# Patient Record
Sex: MALE | Race: BLACK OR AFRICAN AMERICAN | NOT HISPANIC OR LATINO | Employment: UNEMPLOYED | URBAN - METROPOLITAN AREA
[De-identification: names, ages, dates, MRNs, and addresses within clinical notes are randomized per-mention and may not be internally consistent; named-entity substitution may affect disease eponyms.]

---

## 2020-07-27 ENCOUNTER — OFFICE VISIT (OUTPATIENT)
Dept: FAMILY MEDICINE | Facility: CLINIC | Age: 35
End: 2020-07-27
Payer: MEDICAID

## 2020-07-27 VITALS
WEIGHT: 189 LBS | TEMPERATURE: 98.3 F | HEART RATE: 114 BPM | BODY MASS INDEX: 29.66 KG/M2 | SYSTOLIC BLOOD PRESSURE: 135 MMHG | DIASTOLIC BLOOD PRESSURE: 85 MMHG | RESPIRATION RATE: 16 BRPM | OXYGEN SATURATION: 98 % | HEIGHT: 67 IN

## 2020-07-27 DIAGNOSIS — Z00.00 ROUTINE GENERAL MEDICAL EXAMINATION AT A HEALTH CARE FACILITY: Primary | ICD-10-CM

## 2020-07-27 DIAGNOSIS — F31.81 BIPOLAR II DISORDER (H): ICD-10-CM

## 2020-07-27 DIAGNOSIS — F90.9 ATTENTION DEFICIT HYPERACTIVITY DISORDER (ADHD), UNSPECIFIED ADHD TYPE: ICD-10-CM

## 2020-07-27 LAB
BASOPHILS # BLD AUTO: 0 10E9/L (ref 0–0.2)
BASOPHILS NFR BLD AUTO: 0.5 %
DIFFERENTIAL METHOD BLD: ABNORMAL
EOSINOPHIL # BLD AUTO: 0.1 10E9/L (ref 0–0.7)
EOSINOPHIL NFR BLD AUTO: 2.7 %
ERYTHROCYTE [DISTWIDTH] IN BLOOD BY AUTOMATED COUNT: 13 % (ref 10–15)
HBA1C MFR BLD: 5.3 % (ref 0–5.6)
HCT VFR BLD AUTO: 45.1 % (ref 40–53)
HGB BLD-MCNC: 15.4 G/DL (ref 13.3–17.7)
LYMPHOCYTES # BLD AUTO: 1.3 10E9/L (ref 0.8–5.3)
LYMPHOCYTES NFR BLD AUTO: 35.4 %
MCH RBC QN AUTO: 28.9 PG (ref 26.5–33)
MCHC RBC AUTO-ENTMCNC: 34.1 G/DL (ref 31.5–36.5)
MCV RBC AUTO: 85 FL (ref 78–100)
MONOCYTES # BLD AUTO: 0.6 10E9/L (ref 0–1.3)
MONOCYTES NFR BLD AUTO: 15.8 %
NEUTROPHILS # BLD AUTO: 1.7 10E9/L (ref 1.6–8.3)
NEUTROPHILS NFR BLD AUTO: 45.6 %
PLATELET # BLD AUTO: 238 10E9/L (ref 150–450)
RBC # BLD AUTO: 5.32 10E12/L (ref 4.4–5.9)
WBC # BLD AUTO: 3.7 10E9/L (ref 4–11)

## 2020-07-27 PROCEDURE — 83036 HEMOGLOBIN GLYCOSYLATED A1C: CPT | Performed by: INTERNAL MEDICINE

## 2020-07-27 PROCEDURE — 36415 COLL VENOUS BLD VENIPUNCTURE: CPT | Performed by: INTERNAL MEDICINE

## 2020-07-27 PROCEDURE — 99385 PREV VISIT NEW AGE 18-39: CPT | Performed by: INTERNAL MEDICINE

## 2020-07-27 PROCEDURE — 80053 COMPREHEN METABOLIC PANEL: CPT | Performed by: INTERNAL MEDICINE

## 2020-07-27 PROCEDURE — 80061 LIPID PANEL: CPT | Performed by: INTERNAL MEDICINE

## 2020-07-27 PROCEDURE — 85025 COMPLETE CBC W/AUTO DIFF WBC: CPT | Performed by: INTERNAL MEDICINE

## 2020-07-27 RX ORDER — OLANZAPINE 20 MG/1
20 TABLET ORAL 2 TIMES DAILY PRN
COMMUNITY
Start: 2020-07-14 | End: 2022-07-22

## 2020-07-27 ASSESSMENT — PAIN SCALES - GENERAL: PAINLEVEL: NO PAIN (0)

## 2020-07-27 ASSESSMENT — MIFFLIN-ST. JEOR: SCORE: 1745.93

## 2020-07-27 NOTE — PATIENT INSTRUCTIONS
Preventive Health Recommendations  Male Ages 26 - 39    Yearly exam:             See your health care provider every year in order to  o   Review health changes.   o   Discuss preventive care.    o   Review your medicines if your doctor has prescribed any.    You should be tested each year for STDs (sexually transmitted diseases), if you re at risk.     After age 35, talk to your provider about cholesterol testing. If you are at risk for heart disease, have your cholesterol tested at least every 5 years.     If you are at risk for diabetes, you should have a diabetes test (fasting glucose).  Shots: Get a flu shot each year. Get a tetanus shot every 10 years.     Nutrition:    Eat at least 5 servings of fruits and vegetables daily.     Eat whole-grain bread, whole-wheat pasta and brown rice instead of white grains and rice.     Get adequate Calcium and Vitamin D.     Lifestyle    Exercise for at least 150 minutes a week (30 minutes a day, 5 days a week). This will help you control your weight and prevent disease.     Limit alcohol to one drink per day.     No smoking.     Wear sunscreen to prevent skin cancer.     See your dentist every six months for an exam and cleaning.     Patient Education     Understanding Bipolar Disorder  Bipolar disorder is a serious disorder of the brain. It may severely disrupt your life. At times, it may cause you and your loved ones great pain. But there is hope. Although there is no cure, treatment can help control your symptoms. Talk with your healthcare provider or a mental health professional. He or she can offer guidance and support.    What causes bipolar disorder?  The exact causes of bipolar disorder aren t known. It is known that the disease runs in families. Genes that affect nerve cells in the brain may be inherited, but as yet these genes have not been found.  Who does it affect?  Over 5 million adults in this country have bipolar disorder. Most often, it strikes young  adults. It can affect children and older adults as well. Bipolar disorder affects both men and women. It can strike people of all races, cultures, and incomes.  Ups and downs  Bipolar disorder used to be called manic-depressive illness. That is because it causes extreme mood swings. At times the person may feel almost too happy. These times are often followed by great despair. In some cases, both extremes may occur at once. More often, moods shift back and forth. These mood swings may occur just once in a while. Or they may happen 4 or more times a year. Without treatment, they will likely recur throughout life.  Manic episodes  During manic episodes of bipolar disorder, you feel like you re on top of the world. Even the worst news can t bring you down. You ll likely feel as if you can do anything. And sometimes you may try. You may take great risks, thinking you can t be hurt. You may also talk too fast, and your thoughts may race. You may go for days without sleeping. And you might be very active and do a lot of things in a short time. Manic episodes often end in a depression.  Depressive episodes  In depressive episodes, you feel intense sadness and depression. You may also feel worthless, tired, and helpless. Even the things you value most don t give you pleasure. At times you may want to die. You may even think about taking your own life.  Date Last Reviewed: 2/1/2017 2000-2019 [employer]. [address] [place] [address], [place] [address]. All rights reserved. This information is not intended as a substitute for professional medical care. Always follow your healthcare professional's instructions.
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
## 2020-07-28 ENCOUNTER — TELEPHONE (OUTPATIENT)
Dept: FAMILY MEDICINE | Facility: CLINIC | Age: 35
End: 2020-07-28

## 2020-07-28 DIAGNOSIS — E78.1 HYPERTRIGLYCERIDEMIA: Primary | ICD-10-CM

## 2020-07-28 LAB
ALBUMIN SERPL-MCNC: 3.6 G/DL (ref 3.4–5)
ALP SERPL-CCNC: 77 U/L (ref 40–150)
ALT SERPL W P-5'-P-CCNC: 73 U/L (ref 0–70)
ANION GAP SERPL CALCULATED.3IONS-SCNC: 7 MMOL/L (ref 3–14)
AST SERPL W P-5'-P-CCNC: 37 U/L (ref 0–45)
BILIRUB SERPL-MCNC: 0.2 MG/DL (ref 0.2–1.3)
BUN SERPL-MCNC: 12 MG/DL (ref 7–30)
CALCIUM SERPL-MCNC: 9.2 MG/DL (ref 8.5–10.1)
CHLORIDE SERPL-SCNC: 107 MMOL/L (ref 94–109)
CHOLEST SERPL-MCNC: 193 MG/DL
CO2 SERPL-SCNC: 25 MMOL/L (ref 20–32)
CREAT SERPL-MCNC: 1.08 MG/DL (ref 0.66–1.25)
GFR SERPL CREATININE-BSD FRML MDRD: 87 ML/MIN/{1.73_M2}
GLUCOSE SERPL-MCNC: 90 MG/DL (ref 70–99)
HDLC SERPL-MCNC: 55 MG/DL
LDLC SERPL CALC-MCNC: 92 MG/DL
NONHDLC SERPL-MCNC: 138 MG/DL
POTASSIUM SERPL-SCNC: 4 MMOL/L (ref 3.4–5.3)
PROT SERPL-MCNC: 7.8 G/DL (ref 6.8–8.8)
SODIUM SERPL-SCNC: 139 MMOL/L (ref 133–144)
TRIGL SERPL-MCNC: 230 MG/DL

## 2020-07-28 NOTE — TELEPHONE ENCOUNTER
Patient's triglycerides are elevated  I talked to him  Most probably could be from combination of diet and olanzapine  Discussed with him about exercise and diet  We will repeat again in 6 months to 1 year and if not improving then will need treatment

## 2020-07-29 ENCOUNTER — TELEPHONE (OUTPATIENT)
Dept: FAMILY MEDICINE | Facility: CLINIC | Age: 35
End: 2020-07-29

## 2020-07-29 NOTE — TELEPHONE ENCOUNTER
Reason for Call:  Other Forms    Detailed comments: Pt calling for Social Security sent him a letter and they want Pt to fill out a form and wanted to know if Dr. Hickman is suppose to be filling out a portion of the form and would like a call back to discuss further for Pt is not sure.    Phone Number Patient can be reached at: Home number on file [phone] (home)    Best Time: anytime    Can we leave a detailed message on this number? YES    Call taken on 7/29/2020 at 7:57 AM by Reji Dumont

## 2020-08-13 ENCOUNTER — TELEPHONE (OUTPATIENT)
Dept: FAMILY MEDICINE | Facility: CLINIC | Age: 35
End: 2020-08-13

## 2020-08-13 NOTE — TELEPHONE ENCOUNTER
Contact Argenis @ [facility] [phone] she wants to verify medications.  Wondering specifically about Metformin for patient.

## 2020-08-13 NOTE — TELEPHONE ENCOUNTER
"Delegating to team, please return call to Argenis at group home and clarify what she is needing or asking.  Do not have Metformin on current medication list. Only have Zyprexa (Olanzapine) and is listed as \"patient reported\". Patient is new to our office. May need to get a copy of a current medication list they have or verify meds.  Route encounter as appropriate, with additional information.  Thank you.    Lidia Philippe RN  [facility]      "

## 2020-08-13 NOTE — TELEPHONE ENCOUNTER
Reason for Call:  Other call back    Detailed comments: Patient would like call back to verify medications.  See note below.    Phone Number Patient can be reached at: Cell number on file:    Telephone Information:   Mobile [phone]       Best Time: Any    Can we leave a detailed message on this number? YES    Call taken on 8/13/2020 at 1:49 PM by Danielito Bar

## 2020-08-18 NOTE — TELEPHONE ENCOUNTER
This writer attempted to contact Argenis on 08/18/20      Reason for call medication rec and left message.      If patient calls back:   [facility] Team (MA/TC) called. Inform patient that someone from the team will contact them, document that pt called and route to care team.         Lara Rodriguez

## 2020-08-19 RX ORDER — GABAPENTIN 600 MG/1
600 TABLET ORAL 2 TIMES DAILY
Qty: 60 TABLET | Refills: 0 | COMMUNITY
Start: 2020-08-19 | End: 2020-09-25

## 2020-08-19 RX ORDER — ESCITALOPRAM OXALATE 20 MG/1
20 TABLET ORAL
COMMUNITY
Start: 2020-08-19 | End: 2020-09-25

## 2020-08-19 RX ORDER — ESCITALOPRAM OXALATE 5 MG/1
5 TABLET ORAL 2 TIMES DAILY
Qty: 60 TABLET | Refills: 0 | COMMUNITY
Start: 2020-08-19 | End: 2020-09-25

## 2020-08-19 RX ORDER — PHENYTOIN 50 MG/1
50 TABLET, CHEWABLE ORAL 2 TIMES DAILY
Qty: 60 TABLET | Refills: 0 | COMMUNITY
Start: 2020-08-19 | End: 2020-09-25

## 2020-08-19 NOTE — TELEPHONE ENCOUNTER
Reason for Call:  Other prescription    Detailed comments: Pt returning phone call and would like a phone call back regarding medication    Phone Number Patient can be reached at: Other phone number:  [phone]    Best Time: anytime    Can we leave a detailed message on this number? YES    Call taken on 8/19/2020 at 8:02 AM by Reji Dumont

## 2020-08-19 NOTE — TELEPHONE ENCOUNTER
Medication list has been updated by me  He is on Lexapro 25 mg p.o. twice daily  This is a very high dose I will have to review this again when I see the patient next time and verify this and see who has prescribed it and if he is following with a mental health specialist

## 2020-08-19 NOTE — TELEPHONE ENCOUNTER
Called Argenis to clarify what medications are needed. Argenis is with  Alok and he states he is on :    Dilantin 50mg BID    Metformin (unable to tell me dose) - not taking since february    Gabapentin 600mg BID    Lexapro 25mg BID

## 2020-09-25 ENCOUNTER — OFFICE VISIT (OUTPATIENT)
Dept: FAMILY MEDICINE | Facility: CLINIC | Age: 35
End: 2020-09-25
Payer: MEDICAID

## 2020-09-25 VITALS
TEMPERATURE: 98.1 F | RESPIRATION RATE: 20 BRPM | HEIGHT: 67 IN | BODY MASS INDEX: 32.49 KG/M2 | WEIGHT: 207 LBS | SYSTOLIC BLOOD PRESSURE: 134 MMHG | OXYGEN SATURATION: 98 % | DIASTOLIC BLOOD PRESSURE: 82 MMHG | HEART RATE: 88 BPM

## 2020-09-25 DIAGNOSIS — R56.9 SEIZURES (H): ICD-10-CM

## 2020-09-25 DIAGNOSIS — E78.1 HIGH TRIGLYCERIDES: ICD-10-CM

## 2020-09-25 DIAGNOSIS — G47.00 INSOMNIA, UNSPECIFIED TYPE: ICD-10-CM

## 2020-09-25 DIAGNOSIS — M79.661 PAIN OF RIGHT LOWER LEG: ICD-10-CM

## 2020-09-25 DIAGNOSIS — F90.9 ATTENTION DEFICIT HYPERACTIVITY DISORDER (ADHD), UNSPECIFIED ADHD TYPE: ICD-10-CM

## 2020-09-25 DIAGNOSIS — F31.81 BIPOLAR II DISORDER (H): Primary | ICD-10-CM

## 2020-09-25 LAB — PHENYTOIN SERPL-MCNC: 0.7 MG/L (ref 10–20)

## 2020-09-25 PROCEDURE — 99214 OFFICE O/P EST MOD 30 MIN: CPT | Performed by: INTERNAL MEDICINE

## 2020-09-25 PROCEDURE — 80185 ASSAY OF PHENYTOIN TOTAL: CPT | Performed by: INTERNAL MEDICINE

## 2020-09-25 PROCEDURE — 36415 COLL VENOUS BLD VENIPUNCTURE: CPT | Performed by: INTERNAL MEDICINE

## 2020-09-25 RX ORDER — GABAPENTIN 600 MG/1
600 TABLET ORAL 2 TIMES DAILY
Qty: 60 TABLET | Refills: 1 | Status: SHIPPED | OUTPATIENT
Start: 2020-09-25 | End: 2020-10-22

## 2020-09-25 RX ORDER — ESCITALOPRAM OXALATE 20 MG/1
20 TABLET ORAL
Qty: 60 TABLET | Refills: 1 | Status: SHIPPED | OUTPATIENT
Start: 2020-09-25 | End: 2020-11-08

## 2020-09-25 RX ORDER — MIRTAZAPINE 45 MG/1
45 TABLET, FILM COATED ORAL AT BEDTIME
Qty: 30 TABLET | Refills: 1 | Status: SHIPPED | OUTPATIENT
Start: 2020-09-25 | End: 2020-10-22

## 2020-09-25 RX ORDER — ESCITALOPRAM OXALATE 5 MG/1
5 TABLET ORAL 2 TIMES DAILY
Qty: 60 TABLET | Refills: 1 | Status: SHIPPED | OUTPATIENT
Start: 2020-09-25 | End: 2020-10-22

## 2020-09-25 RX ORDER — QUETIAPINE FUMARATE 50 MG/1
1 TABLET, FILM COATED ORAL AT BEDTIME
COMMUNITY
Start: 2020-09-09 | End: 2022-07-22

## 2020-09-25 RX ORDER — PHENYTOIN 50 MG/1
50 TABLET, CHEWABLE ORAL 2 TIMES DAILY
Qty: 60 TABLET | Refills: 1 | Status: SHIPPED | OUTPATIENT
Start: 2020-09-25 | End: 2020-10-22

## 2020-09-25 ASSESSMENT — MIFFLIN-ST. JEOR: SCORE: 1827.58

## 2020-09-25 ASSESSMENT — PAIN SCALES - GENERAL: PAINLEVEL: NO PAIN (0)

## 2020-09-25 NOTE — PATIENT INSTRUCTIONS
At [facility], we strive to deliver an exceptional experience to you, every time we see you. If you receive a survey, please complete it as we do value your feedback.  If you have MyChart, you can expect to receive results automatically within 24 hours of their completion.  Your provider will send a note interpreting your results as well.   If you do not have MyChart, you should receive your results in about a week by mail.    Your care team:                            Family Medicine Internal Medicine   MD James Ybarra MD Shantel Branch-Fleming, MD Srinivasa Vaka, MD Katya Georgiev PA-C Megan Hill, APRN CNP    Dixon Mayes, MD Pediatrics   Chidi Deutsch, PARachelC  Darlene Lewis, CNP MD Eryn Monroe APRN CNP   MD Mary Barajas MD Deborah Mielke, MD Maria Alexander, APRN CNP  Yun Cuellar, PARachelC  Ivelisse Fam, CNP  MD Pat Mckeon MD Angela Wermerskirchen, MD      Clinic hours: Monday - Thursday 7 am-7 pm; Fridays 7 am-5 pm.   Urgent care: Monday - Friday 11 am-9 pm; Saturday and Sunday 9 am-5 pm.    Clinic: [phone]       [facility]: Monday - Thursday 8 am - 7 pm; Friday 8 am - 6 pm  [facility]: [phone]     Use [email or web address] for 24/7 diagnosis and treatment of dozens of conditions.

## 2020-09-25 NOTE — PROGRESS NOTES
"Subjective     Alok Aquino is a 34 year old male who presents to clinic today for the following health issues:    HPI   Here today to discuss about his medications  He wants to get renewal of his Seroquel/Lexapro  Tells me that he is also on mirtazapine for insomnia which I checked the records and is true  He also takes Dialntin for seizures  Complaining of pain in the right leg today  Is a chronic pain for which he takes gabapentin  He had a bad injury few years ago and he is having trouble now walking because of it and having pain  He wants UFO for that  Medication Followup of all medication    Taking Medication as prescribed: yes taking a few at the Group Home -Zyprexa and Seroquel.    Side Effects:  None    Medication Helping Symptoms:  yes         Review of Systems   Constitutional, HEENT, cardiovascular, pulmonary, gi and gu systems are negative, except as otherwise noted.      Objective    /82 (BP Location: Left arm, Patient Position: Chair, Cuff Size: Adult Regular)   Pulse 88   Temp 98.1  F (36.7  C) (Oral)   Resp 20   Ht 1.702 m (5' 7\")   Wt 93.9 kg (207 lb)   SpO2 98%   BMI 32.42 kg/m    Body mass index is 32.42 kg/m .  Physical Exam   GENERAL: healthy, alert and no distress  EYES: Eyes grossly normal to inspection, PERRL and conjunctivae and sclerae normal  HENT: ear canals and TM's normal, nose and mouth without ulcers or lesions  NECK: no adenopathy, no asymmetry, masses, or scars and thyroid normal to palpation  RESP: lungs clear to auscultation - no rales, rhonchi or wheezes  CV: regular rate and rhythm, normal S1 S2, no S3 or S4, no murmur, click or rub, no peripheral edema and peripheral pulses strong  ABDOMEN: soft, nontender, no hepatosplenomegaly, no masses and bowel sounds normal  MS: Scar noted on the backside of right leg  SKIN: no suspicious lesions or rashes  NEURO: Normal strength and tone, mentation intact and speech normal  PSYCH: mentation appears normal, affect " normal/bright            Assessment & Plan     Bipolar II disorder (H)  He also takes Zyprexa and Seroquel  I had a lipid panel done earlier this year which showed elevated triglycerides and normal LDL normal A1c  We discussed at that point about diet and exercise  - escitalopram (LEXAPRO) 20 MG tablet; Take 1 tablet (20 mg) by mouth 2 times daily  - escitalopram (LEXAPRO) 5 MG tablet; Take 1 tablet (5 mg) by mouth 2 times daily    Attention deficit hyperactivity disorder (ADHD), unspecified ADHD type  He is not on any stimulants    Pain of right lower leg  Longstanding problem he wants a UFO  - Orthopedic & Spine  Referral; Future  - gabapentin (NEURONTIN) 600 MG tablet; Take 1 tablet (600 mg) by mouth 2 times daily    Insomnia, unspecified type  This is old medication and I verified it in the records  - mirtazapine (REMERON) 45 MG tablet; Take 1 tablet (45 mg) by mouth At Bedtime    Seizures (H)  Per patient he was not taking it for some time when he was not present but I restarted it in August  We will check phenytoin levels  - phenytoin (DILANTIN) 50 MG chewable tablet; Take 1 tablet (50 mg) by mouth 2 times daily  - Phenytoin level           Return in about 3 months (around 12/25/2020).    Jon Hickman MD  [facility]

## 2020-10-22 DIAGNOSIS — G47.00 INSOMNIA, UNSPECIFIED TYPE: ICD-10-CM

## 2020-10-22 DIAGNOSIS — F31.81 BIPOLAR II DISORDER (H): ICD-10-CM

## 2020-10-22 DIAGNOSIS — M79.661 PAIN OF RIGHT LOWER LEG: ICD-10-CM

## 2020-10-22 DIAGNOSIS — R56.9 SEIZURES (H): ICD-10-CM

## 2020-10-22 RX ORDER — MIRTAZAPINE 45 MG/1
45 TABLET, FILM COATED ORAL AT BEDTIME
Qty: 30 TABLET | Refills: 1 | Status: SHIPPED | OUTPATIENT
Start: 2020-10-22 | End: 2020-12-07

## 2020-10-22 RX ORDER — GABAPENTIN 600 MG/1
600 TABLET ORAL 2 TIMES DAILY
Qty: 60 TABLET | Refills: 1 | Status: SHIPPED | OUTPATIENT
Start: 2020-10-22 | End: 2020-12-07

## 2020-10-22 RX ORDER — ESCITALOPRAM OXALATE 5 MG/1
5 TABLET ORAL 2 TIMES DAILY
Qty: 60 TABLET | Refills: 1 | Status: SHIPPED | OUTPATIENT
Start: 2020-10-22 | End: 2020-12-07

## 2020-10-22 RX ORDER — PHENYTOIN 50 MG/1
TABLET, CHEWABLE ORAL
Qty: 60 TABLET | Refills: 1 | Status: SHIPPED | OUTPATIENT
Start: 2020-10-22 | End: 2020-12-29

## 2020-10-29 ENCOUNTER — OFFICE VISIT (OUTPATIENT)
Dept: ORTHOPEDICS | Facility: CLINIC | Age: 35
End: 2020-10-29
Payer: MEDICAID

## 2020-10-29 VITALS — BODY MASS INDEX: 32.49 KG/M2 | HEIGHT: 67 IN | WEIGHT: 207 LBS

## 2020-10-29 DIAGNOSIS — M79.661 PAIN OF RIGHT LOWER LEG: ICD-10-CM

## 2020-10-29 DIAGNOSIS — M21.371 RIGHT FOOT DROP: Primary | ICD-10-CM

## 2020-10-29 DIAGNOSIS — G56.91 NEUROPATHY OF FINGER OF RIGHT HAND: ICD-10-CM

## 2020-10-29 PROCEDURE — 99214 OFFICE O/P EST MOD 30 MIN: CPT | Performed by: FAMILY MEDICINE

## 2020-10-29 ASSESSMENT — MIFFLIN-ST. JEOR: SCORE: 1827.58

## 2020-10-29 NOTE — PATIENT INSTRUCTIONS
Thanks for coming today.  Ortho/Sports Medicine Clinic  [address] [place] [address]    To schedule future appointments in Ortho Clinic, you may call [phone].    To schedule ordered imaging by your Provider: Call [facility] at [phone]    [email or web address] available online at:   [email or web address]    Please call if any further questions or concerns [phone] and ask for the Orthopedic Department. Clinic hours 8 am to 5 pm.    Return to clinic if symptoms worsen.
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
## 2020-10-29 NOTE — PROGRESS NOTES
"      [facility]  10/29/2020    Alok Aquino's chief complaint for this visit includes:  Chief Complaint   Patient presents with     Consult     Right leg pain, walks with a limp, cut a blood vessel in his upper leg in 2010 and has had a limp since, would like to disucss is an AFO would help.      PCP: No Ref-Primary, Physician    Referring Provider:  Jon Hickman MD  [address]  [place]  [place] [address]    Ht 1.702 m (5' 7\")   Wt 93.9 kg (207 lb)   BMI 32.42 kg/m    Data Unavailable       Reason for visit:     What part of your body is injured / painful?  right leg     What caused the injury /pain? No inciting event     How long ago did your injury occur or pain begin? problem is longstanding    What are your most bothersome symptoms? Pain    How would you characterize your symptom?  aching    What makes your symptoms better? Rest    What makes your symptoms worse? Movement    Have you been previously seen for this problem? No    Medical History:    Any recent changes to your medical history? No    Any new medication prescribed since last visit? No    Have you had surgery on this body part before? No    Social History:    Occupation: Disabled     Handedness: Right    Review of Systems:    Do you have fever, chills, weight loss? No    Do you have any vision problems? No    Do you have any chest pain or edema? No    Do you have any shortness of breath or wheezing?  No    Do you have stomach problems? No    Do you have any urinary track issues? No    Do you have any numbness or focal weakness? No    Do you have diabetes? No    Do you have problems with bleeding or clotting? No    Do you have an rashes or other skin lesions? No       CHIEF COMPLAINT:  Consult (Right leg pain, walks with a limp, cut a blood vessel in his upper leg in 2010 and has had a limp since, would like to disucss is an AFO would help. )       HISTORY OF PRESENT ILLNESS  Mr. Aquino is a pleasant 34 year old male " who presents to clinic today with issues with his right foot and right hand.  Thom was injured in 2010 when he kicked through a window.  He severed arteries and nerves in the posterior aspect of his right lower leg.  He underwent extensive surgery and has had pain and dysfunction ever since.  He does walk with a limp, he feels that his right foot will drop at times.  He is interested in assistive devices for ambulation.    Thom also has a right fifth finger issue.  This is from a separate injury in 2010 when he grabbed a knife by the blade with his palm.  He has had difficulty flexing his finger ever since this episode.  He does get his finger caught on certain objects throughout the day.  This is his dominant hand.  He is interested in a splint for this.    Additional history: as documented    MEDICAL HISTORY  Patient Active Problem List   Diagnosis     ADHD (attention deficit hyperactivity disorder)     Bipolar II disorder (H)     High triglycerides     Seizures (H)     Insomnia, unspecified type       Current Outpatient Medications   Medication Sig Dispense Refill     escitalopram (LEXAPRO) 20 MG tablet Take 1 tablet (20 mg) by mouth 2 times daily 60 tablet 1     escitalopram (LEXAPRO) 5 MG tablet TAKE 1 TABLET (5 MG) BY MOUTH 2 TIMES DAILY 60 tablet 1     gabapentin (NEURONTIN) 600 MG tablet TAKE 1 TABLET (600 MG) BY MOUTH 2 TIMES DAILY 60 tablet 1     mirtazapine (REMERON) 45 MG tablet TAKE 1 TABLET (45 MG) BY MOUTH AT BEDTIME 30 tablet 1     OLANZapine (ZYPREXA) 20 MG tablet Take 20 mg by mouth 2 times daily as needed        phenytoin (DILANTIN) 50 MG chewable tablet CHEW AND SWALLOW ONE TABLET BY MOUTH TWO TIMES A DAY 60 tablet 1     QUEtiapine (SEROQUEL) 50 MG tablet Take 1 tablet by mouth At Bedtime         No Known Allergies    No family history on file.    Additional medical/Social/[facility] and updated as appropriate.        PHYSICAL EXAM    Vitals:    10/29/20 1445   Weight:  "93.9 kg (207 lb)   Height: 1.702 m (5' 7\")     General  - normal appearance, in no obvious distress  HEENT  - conjunctivae not injected, moist mucous membranes  CV  - normal pulses at posterior tib and dorsalis pedis  Pulm  - normal respiratory pattern, non-labored  Musculoskeletal - right lower leg, foot  - stance: gait favors right leg  - inspection: no swelling or effusion, normal bone and joint alignment, no obvious deformity  - palpation: no bony or soft tissue tenderness  - ROM: normal active and passive ROM of great and lesser toes, no pain with MT translation  - strength: 5/5 in all planes, objectively weaker in right foot dorsiflexion, plantarflexion    Musculoskeletal - right 5th finger  - inspection: no atrophy, normal joint alignment, no swelling  - palpation: no bony or soft tissue tenderness, no tenderness at the anatomical snuffbox  - ROM: cannot activate flexion of DIP  - special tests:  (-) varus  (-) valgus    Neuro  - no sensory or motor deficit, grossly normal coordination, normal muscle tone  Skin  - no ecchymosis, erythema, warmth, or induration, no obvious rash  Psych  - interactive, appropriate, normal mood and affect           ASSESSMENT & PLAN  Mr. Aquino is a 34 year old male who presents to clinic today with pain and weakness in his right foot and weakness in his right fifth finger.    I am referring Thom to our orthotist, who may be able to fit him with a ankle-foot orthotic or other brace to assist him in ambulation.    I suspect that Thom has either a flexor digitorum profundus injury or nerve damage of his fifth finger that is preventing flexion of his DIP.  I am referring Thom to hand therapy for a custom splint for his fifth finger.    We can follow-up as needed for this and other issues.    It was a pleasure seeing Thom today.    Maicol Preciado DO, [facility]  Primary [facility] Sports Medicine      This note was constructed using Dragon dictation software, please excuse any minor errors in " spelling, grammar, or syntax.

## 2020-11-07 DIAGNOSIS — F31.81 BIPOLAR II DISORDER (H): ICD-10-CM

## 2020-11-08 RX ORDER — ESCITALOPRAM OXALATE 20 MG/1
20 TABLET ORAL
Qty: 56 TABLET | Refills: 0 | Status: SHIPPED | OUTPATIENT
Start: 2020-11-08 | End: 2020-12-07

## 2020-11-10 ENCOUNTER — THERAPY VISIT (OUTPATIENT)
Dept: OCCUPATIONAL THERAPY | Facility: CLINIC | Age: 35
End: 2020-11-10
Payer: MEDICAID

## 2020-11-10 DIAGNOSIS — G56.91 NEUROPATHY OF FINGER OF RIGHT HAND: ICD-10-CM

## 2020-11-10 PROCEDURE — 97760 ORTHOTIC MGMT&TRAING 1ST ENC: CPT | Mod: GO | Performed by: OCCUPATIONAL THERAPIST

## 2020-11-10 NOTE — LETTER
DEPARTMENT OF HEALTH AND HUMAN SERVICES  CENTERS FOR MEDICARE & MEDICAID SERVICES    PLAN/UPDATED PLAN OF PROGRESS FOR OUTPATIENT REHABILITATION    PATIENTS NAME:  Alok Aquino     : 1985    PROVIDER NUMBER:  [id number]    [place]:  [id number]    PROVIDER NAME: [facility]    MEDICAL RECORD NUMBER: [id number]     START OF CARE DATE:          TYPE:  OT    PRIMARY/TREATMENT DIAGNOSIS: (Pertinent Medical Diagnosis)  Neuropathy of finger of right hand    VISITS FROM START OF CARE:  Rxs Used: 1     Hand Therapy Initial Evaluation  Current Date:  11/10/2020  Referring Physician: Dr. Preciado  Diagnosis: Right SF  DOI: 2002    Subjective:  Alok Aquino is a 34 year old right hand dominant male.    Patient reports symptoms of pain, stiffness/loss of motion, weakness/loss of strength, numbness and tingling (when cold outside)  of the right small finger which occurred due to an injury sustained while grabbing a knife.  Patient reports that he had nerve damage and tendon laceration. Since onset symptoms are Gradually getting worse.  Special tests:  none.  Previous treatment: none.        General health as reported by patient is poor.  Pertinent medical history includes:None  Medical allergies:none.  Surgical history: none.  Medication history: None.    Occupational Profile Information:  Current occupation is none  Prior functional level:  no limitations  Barriers include:lives in a group home.  Has trouble lifting things up with right hand  Mobility: walks with a limp, foot drop  Transportation: medical transportation  Leisure activities/hobbies: play cards, play games, video games, exercise, walking    Upper Extremity Functional Index Score:  SCORE:   Column Totals: /80: 34   (A lower score indicates greater disability.)  Objective:  Pain Level (Scale 0-10) Patient reports no pain in the finger only tingling and numbness and rates that at 7/10   11/10/2020   At Rest 0/10   With Use  0/10     Pain Description  Date 11/10/2020   Location small finger   Pain Quality Numb and Tingling   Frequency constant     Pain is worst  same day and night   Exacerbated by  use   Relieved by Rest finger in the bent position   Progression Gradually worsening     EdemaNone    Sensation Decreased Ulnar Nerve distribution per pt report    ROM  small Finger 11/10/20 11/10/20   AROM (PROM) Left Right   MCP /85 /60 (90)   PIP /90 /90(90)   DIP /70 /0(60)   LARA         Strength   Painfree (Measured in pounds)  Pain Report:  + mild    ++ moderate    +++ severe    11/10/2020 11/10/2020   Trials Left Right   1  2  3 48  45  43 26  24  25   Average 45 25     Palpation  Pain Report:  - none    + mild    ++ moderate    +++ severe     11/10/20      Between SF & MF MCP ++         Assessment:  Patient presents with symptoms consistent with diagnosis of right small finger neuopathy,  with conservative intervention.     Patient's limitations or Problem List includes:  Decreased ROM/motion, Weakness, Sensory disturbance and Decreased  of the right small finger which interferes with the patient's ability to perform Self Care Tasks (dressing, bathing), Recreational Activities, Household Chores and Driving  as compared to previous level of function.    Rehab Potential:  Good - Return to full activity, some limitations    Patient will benefit from skilled Occupational Therapy to increase flexibility, overall strength and sensation and decrease pain to return to previous activity level and resume normal daily tasks and to reach their rehab potential.    Barriers to Learning:  Mental health    Communication Issues:  Patient appears to be able to clearly communicate and understand verbal and written communication and follow directions correctly.    Chart Review: Brief history including review of medical and/or therapy records relating to the presenting problem and Simple history review with patient    Identified Performance  "Deficits: bathing/showering, dressing, home establishment and management, work and leisure activities    Assessment of Occupational Performance:  3-5 Performance Deficits    Clinical Decision Making (Complexity): Low complexity    Treatment Explanation:  The following has been discussed with the patient:  RX ordered/plan of care  Anticipated outcomes  Possible risks and side effects    Plan:  Frequency:  1 X week, once daily  Duration:  for 4 weeks    Treatment Plan:   Orthotic Fabrication:  Static orthosis and Finger based orthosis    Discharge Plan:  Achieve all LTG.  Independent in home treatment program.  Reach maximal therapeutic benefit.    Home Exercise Program:  Compliance with finger gutter splint in semi flexed posture to relieve nerve symptoms    Next Visit:  No further visits scheduled at this time. The chart will be left open for one month to allow patient to schedule further appointments if problems develop. If no additional therapy sessions are scheduled, then the patient will be discharged and this will serve as a discharge note.    Caregiver Signature/Credentials ______________________________ Date ________       Treating Provider: Ching ELDER/L,CHT      I have reviewed and certified the need for these services and plan of treatment while under my care.          PHYSICIAN'S SIGNATURE:   _____________________________________  Date___________                        Maicol Preciado DO    Certification period:     11/10/20 to 02/07/20    Functional Level Progress Report: Please see attached \"Goal Flow sheet for Functional level.\"    ___X_____ Continue Services or       ________ DC Services                Service dates:  11/10/20  to present                                                                     "

## 2020-11-10 NOTE — PROGRESS NOTES
Hand Therapy Initial Evaluation    Current Date:  11/10/2020  Referring Physician: Dr. Preciado    Diagnosis: Right SF  DOI: 11/2002    Subjective:  Alok Aquino is a 34 year old right hand dominant male.    Patient reports symptoms of pain, stiffness/loss of motion, weakness/loss of strength, numbness and tingling (when cold outside)  of the right small finger which occurred due to an injury sustained while grabbing a knife.  Patient reports that he had nerve damage and tendon laceration. Since onset symptoms are Gradually getting worse.  Special tests:  none.  Previous treatment: none.    General health as reported by patient is poor.  Pertinent medical history includes:None  Medical allergies:none.  Surgical history: none.  Medication history: None.    Occupational Profile Information:  Current occupation is none  Prior functional level:  no limitations  Barriers include:lives in a group home.  Has trouble lifting things up with right hand  Mobility: walks with a limp, foot drop  Transportation: medical transportation  Leisure activities/hobbies: play cards, play games, video games, exercise, walking    Upper Extremity Functional Index Score:  SCORE:   Column Totals: /80: 34   (A lower score indicates greater disability.)    Objective:  Pain Level (Scale 0-10) Patient reports no pain in the finger only tingling and numbness and rates that at 7/10   11/10/2020   At Rest 0/10   With Use 0/10     Pain Description  Date 11/10/2020   Location small finger   Pain Quality Numb and Tingling   Frequency constant     Pain is worst  same day and night   Exacerbated by  use   Relieved by Rest finger in the bent position   Progression Gradually worsening     Edema  None    Sensation   Decreased Ulnar Nerve distribution per pt report    ROM  small Finger 11/10/20 11/10/20   AROM (PROM) Left Right   MCP /85 /60 (90)   PIP /90 /90(90)   DIP /70 /0(60)   LARA         Strength   Painfree (Measured in pounds)  Pain Report:  + mild     ++ moderate    +++ severe    11/10/2020 11/10/2020   Trials Left Right   1  2  3 48  45  43 26  24  25   Average 45 25       Palpation  Pain Report:  - none    + mild    ++ moderate    +++ severe     11/10/20      Between SF & MF MCP ++                                            Assessment:  Patient presents with symptoms consistent with diagnosis of right small finger neuopathy,  with conservative intervention.     Patient's limitations or Problem List includes:  Decreased ROM/motion, Weakness, Sensory disturbance and Decreased  of the right small finger which interferes with the patient's ability to perform Self Care Tasks (dressing, bathing), Recreational Activities, Household Chores and Driving  as compared to previous level of function.    Rehab Potential:  Good - Return to full activity, some limitations    Patient will benefit from skilled Occupational Therapy to increase flexibility, overall strength and sensation and decrease pain to return to previous activity level and resume normal daily tasks and to reach their rehab potential.    Barriers to Learning:  Mental health    Communication Issues:  Patient appears to be able to clearly communicate and understand verbal and written communication and follow directions correctly.    Chart Review: Brief history including review of medical and/or therapy records relating to the presenting problem and Simple history review with patient    Identified Performance Deficits: bathing/showering, dressing, home establishment and management, work and leisure activities    Assessment of Occupational Performance:  3-5 Performance Deficits    Clinical Decision Making (Complexity): Low complexity    Treatment Explanation:  The following has been discussed with the patient:  RX ordered/plan of care  Anticipated outcomes  Possible risks and side effects    Plan:  Frequency:  1 X week, once daily  Duration:  for 4 weeks    Treatment Plan:   Orthotic Fabrication:   Static orthosis and Finger based orthosis    Discharge Plan:  Achieve all LTG.  Independent in home treatment program.  Reach maximal therapeutic benefit.    Home Exercise Program:  Compliance with finger gutter splint in semi flexed posture to relieve nerve symptoms    Next Visit:  No further visits scheduled at this time. The chart will be left open for one month to allow patient to schedule further appointments if problems develop. If no additional therapy sessions are scheduled, then the patient will be discharged and this will serve as a discharge note.

## 2020-12-07 DIAGNOSIS — M79.661 PAIN OF RIGHT LOWER LEG: ICD-10-CM

## 2020-12-07 DIAGNOSIS — F31.81 BIPOLAR II DISORDER (H): ICD-10-CM

## 2020-12-07 DIAGNOSIS — G47.00 INSOMNIA, UNSPECIFIED TYPE: ICD-10-CM

## 2020-12-07 RX ORDER — GABAPENTIN 600 MG/1
600 TABLET ORAL 2 TIMES DAILY
Qty: 56 TABLET | Refills: 1 | Status: SHIPPED | OUTPATIENT
Start: 2020-12-07 | End: 2021-01-21

## 2020-12-07 RX ORDER — ESCITALOPRAM OXALATE 20 MG/1
20 TABLET ORAL
Qty: 56 TABLET | Refills: 0 | Status: SHIPPED | OUTPATIENT
Start: 2020-12-07 | End: 2021-01-03

## 2020-12-07 RX ORDER — MIRTAZAPINE 45 MG/1
45 TABLET, FILM COATED ORAL AT BEDTIME
Qty: 28 TABLET | Refills: 1 | Status: SHIPPED | OUTPATIENT
Start: 2020-12-07 | End: 2020-12-29

## 2020-12-07 RX ORDER — ESCITALOPRAM OXALATE 5 MG/1
5 TABLET ORAL 2 TIMES DAILY
Qty: 56 TABLET | Refills: 1 | Status: SHIPPED | OUTPATIENT
Start: 2020-12-07 | End: 2021-01-19

## 2020-12-29 DIAGNOSIS — F31.81 BIPOLAR II DISORDER (H): ICD-10-CM

## 2021-01-03 RX ORDER — ESCITALOPRAM OXALATE 20 MG/1
TABLET ORAL
Qty: 56 TABLET | Refills: 8 | Status: SHIPPED | OUTPATIENT
Start: 2021-01-03 | End: 2022-07-22

## 2021-01-19 ENCOUNTER — DOCUMENTATION ONLY (OUTPATIENT)
Dept: FAMILY MEDICINE | Facility: CLINIC | Age: 36
End: 2021-01-19

## 2021-01-19 NOTE — PROGRESS NOTES
Lexapro dose clarification  He is on 20 mg of Lexapro twice a day and also on 5 mg of Lexapro twice a day.  In total he takes 50 mg of Lexapro twice a day.

## 2021-02-05 ENCOUNTER — TELEPHONE (OUTPATIENT)
Dept: FAMILY MEDICINE | Facility: CLINIC | Age: 36
End: 2021-02-05

## 2021-02-05 NOTE — TELEPHONE ENCOUNTER
Patient is requesting a note be faxed to [phone], saying that he was seen for his leg and finger and that he got a brace for them. Also, that he is disabled.Isabela Samano

## 2021-02-05 NOTE — LETTER
February 15, 2021    RE:  Alok Aquino                              [address]  [place] [address]            To whom it may concern:    Alok Aquino is under my professional care for a medical condition. Alok has been since for his right lower extremity and right small finger. The patient was prescribed a brace a brace for his right ankle and right small finger. The patient is also disabled.     Please contact our office with any questions or concerns          Sincerely,        Maicol Preciado  DO

## 2021-02-05 NOTE — TELEPHONE ENCOUNTER
Patient was seen for this problem at [facility] who is the sports medicine specialist on 10/29/2020  I referred him there for this issue  He need to contact their office for the letter  Please call the patient and inform him

## 2021-02-05 NOTE — TELEPHONE ENCOUNTER
This writer attempted to contact pt on 02/05/21      Reason for call relay message and left message.      If patient calls back:   Relay message Patient was seen for this problem at [facility] who is the sports medicine specialist on 10/29/2020  I referred him there for this issue  He need to contact their office for the letter  Please call the patient and inform him, (read verbatim), document that pt called and close encounter        Manisha Leon MA

## 2021-02-17 DIAGNOSIS — F31.81 BIPOLAR II DISORDER (H): ICD-10-CM

## 2021-02-17 RX ORDER — ESCITALOPRAM OXALATE 5 MG/1
5 TABLET ORAL 2 TIMES DAILY
Qty: 56 TABLET | Refills: 0 | Status: SHIPPED | OUTPATIENT
Start: 2021-02-17 | End: 2021-03-12

## 2021-03-26 DIAGNOSIS — F31.81 BIPOLAR II DISORDER (H): ICD-10-CM

## 2021-03-26 RX ORDER — ESCITALOPRAM OXALATE 5 MG/1
5 TABLET ORAL 2 TIMES DAILY
Qty: 56 TABLET | Refills: 11 | OUTPATIENT
Start: 2021-03-26

## 2021-04-01 DIAGNOSIS — R56.9 SEIZURES (H): ICD-10-CM

## 2021-04-01 DIAGNOSIS — M79.661 PAIN OF RIGHT LOWER LEG: ICD-10-CM

## 2021-04-01 DIAGNOSIS — G47.00 INSOMNIA, UNSPECIFIED TYPE: ICD-10-CM

## 2021-04-01 RX ORDER — MIRTAZAPINE 45 MG/1
45 TABLET, FILM COATED ORAL AT BEDTIME
Qty: 28 TABLET | Refills: 0 | Status: SHIPPED | OUTPATIENT
Start: 2021-04-01 | End: 2022-07-22

## 2021-04-01 RX ORDER — GABAPENTIN 600 MG/1
600 TABLET ORAL 2 TIMES DAILY
Qty: 56 TABLET | Refills: 0 | Status: SHIPPED | OUTPATIENT
Start: 2021-04-01 | End: 2022-07-22

## 2021-04-01 RX ORDER — PHENYTOIN 50 MG/1
TABLET, CHEWABLE ORAL
Qty: 56 TABLET | Refills: 0 | Status: SHIPPED | OUTPATIENT
Start: 2021-04-01 | End: 2022-08-05

## 2021-04-01 NOTE — TELEPHONE ENCOUNTER
Refills sent but please call patient and/or group home: he is due for a medication check and labs. Please help set that up in the next few weeks.  Thank you,  CHELA Zafar, NP-C  [facility]

## 2021-07-01 ENCOUNTER — TELEPHONE (OUTPATIENT)
Dept: FAMILY MEDICINE | Facility: CLINIC | Age: 36
End: 2021-07-01

## 2021-07-02 ENCOUNTER — TELEPHONE (OUTPATIENT)
Dept: FAMILY MEDICINE | Facility: CLINIC | Age: 36
End: 2021-07-02

## 2021-07-02 NOTE — TELEPHONE ENCOUNTER
(3) [facility]  Placed on Ivelisse Fam's desk for signature.

## 2021-07-05 NOTE — TELEPHONE ENCOUNTER
Forms faxed to [phone] and placed in abstraction bin for scanning into patients chart    Left message to schedule in person visit

## 2021-07-05 NOTE — TELEPHONE ENCOUNTER
See other encounter. Will close this one as the forms were duplicate.  CHELA Zafar, NP-C  [facility]

## 2022-03-22 ENCOUNTER — TELEPHONE (OUTPATIENT)
Dept: FAMILY MEDICINE | Facility: CLINIC | Age: 37
End: 2022-03-22
Payer: MEDICAID

## 2022-03-22 RX ORDER — HALOPERIDOL DECANOATE 100 MG/ML
INJECTION INTRAMUSCULAR
COMMUNITY
Start: 2022-03-22

## 2022-03-22 RX ORDER — HALOPERIDOL 10 MG/1
1 TABLET ORAL AT BEDTIME
COMMUNITY
Start: 2022-02-23 | End: 2022-07-22

## 2022-03-22 NOTE — TELEPHONE ENCOUNTER
Incoming call from nurse Isa, from [facility]. Isa is requesting that pt's Haldol orders be faxed. RN advised Haldol is not managed by primary care and is not currently on pt's medication list, and inquired if pt is followed by a Psychiatric provider.    Isa nurse, from [facility], states pt is taking Haldol 10 mg tab daily at bedtime, and takes Haldol decanoate injection 200 mg IM every 4 weeks. RN updated pt's [facility] medication list to reflect these reported medication doses. nurse Isa, from [facility], states she has identified that it appears these were prescribed by [facility]. Isa states she will reach out to ordering provider's health system for the orders.    YADIRA Crump, RN

## 2022-03-25 DIAGNOSIS — Z91.030 BEE STING ALLERGY: Primary | ICD-10-CM

## 2022-03-25 RX ORDER — EPINEPHRINE 0.3 MG/.3ML
0.3 INJECTION SUBCUTANEOUS PRN
Qty: 2 EACH | Refills: 1 | Status: SHIPPED | OUTPATIENT
Start: 2022-03-25 | End: 2023-03-29

## 2022-03-25 NOTE — TELEPHONE ENCOUNTER
Catalina from [facility] calling on pt's behalf to request an order for Epipen. She states pt is requesting an Epipen for his bee sting allergy.    Routing refill request to provider for review/approval because:  Drug not active on patient's medication list    HENRY CrumpN, RN

## 2022-03-25 NOTE — TELEPHONE ENCOUNTER
Patient has not been seen since 9/25/20 Dr. Hickman   Routing to provider who he saw for physical 7/27/2020

## 2022-03-26 ENCOUNTER — TELEPHONE (OUTPATIENT)
Dept: FAMILY MEDICINE | Facility: CLINIC | Age: 37
End: 2022-03-26
Payer: MEDICAID

## 2022-03-26 NOTE — TELEPHONE ENCOUNTER
Reason for Call:  Medication or medication refill:    Do you use a [facility]?  Name of the pharmacy and phone number for the current request:  [facility] - [phone]    Name of the medication requested: Metropolol 25 mg    Other request: no    Can we leave a detailed message on this number? YES    Phone number patient can be reached at: Other phone number:  [phone], or [phone] ext [phone]    Best Time: Anytime    Call taken on 3/26/2022 at 12:04 PM by Ivana Ho

## 2022-03-28 NOTE — TELEPHONE ENCOUNTER
Spoke to [facility] pharmacy staff.    They are asking for the medication Metoprolol to be sent.    He is new to this pharmacy, they are unsure who to request this medication from.       The patient is in a Treatment facility.  Called the [facility].  The RN is not available, left message with reception to call the [facility] back to discuss the Rx.  Does not appear that the patient has seen a provider since 9/25/2020, however there is a Dr. Amilcar Sebastian listed as his PCP.    This Rx may have come from an outside provider.     Asked to call the [facility] back to discuss where to route this medication request to.    Isa Taylor RN, [facility]

## 2022-03-30 NOTE — TELEPHONE ENCOUNTER
Patient has not seen an [facility] provider since 9/25/2020 and medication is not active on patient's medication list. Patient to schedule visit with provider if needing med filled.    Sirisha Stewart RN  [facility]

## 2022-05-16 ENCOUNTER — TELEPHONE (OUTPATIENT)
Dept: FAMILY MEDICINE | Facility: CLINIC | Age: 37
End: 2022-05-16
Payer: MEDICAID

## 2022-05-18 NOTE — TELEPHONE ENCOUNTER
Lvm for pt to schedule appointment with provider to complete forms. I provided clinic number for pt to call back and schedule.

## 2022-05-18 NOTE — TELEPHONE ENCOUNTER
Patient has not been seen since 2020 by a [place] provider. Needs in person or video visit for filling out forms.  Thank you,  CHELA Zafar, NP-C  [facility]

## 2022-06-03 ENCOUNTER — TELEPHONE (OUTPATIENT)
Dept: FAMILY MEDICINE | Facility: CLINIC | Age: 37
End: 2022-06-03

## 2022-06-03 DIAGNOSIS — F31.81 BIPOLAR II DISORDER (H): Primary | ICD-10-CM

## 2022-06-03 RX ORDER — ESCITALOPRAM OXALATE 10 MG/1
TABLET ORAL
Qty: 16 TABLET | OUTPATIENT
Start: 2022-06-03

## 2022-06-06 ENCOUNTER — TELEPHONE (OUTPATIENT)
Dept: FAMILY MEDICINE | Facility: CLINIC | Age: 37
End: 2022-06-06

## 2022-06-06 NOTE — TELEPHONE ENCOUNTER
Forms received. Needs video or in person visit for filling out form. Patient has not been seen in over a year by a primary care provider. Please help [facility] set that up.  Thank you,  CHELA Zafar, NP-C  [facility]

## 2022-06-07 NOTE — TELEPHONE ENCOUNTER
Lvm for pt to schedule visit with provider to complete forms. I provided him with clinic number to call back.

## 2022-06-24 DIAGNOSIS — I10 HYPERTENSION GOAL BP (BLOOD PRESSURE) < 140/90: Primary | ICD-10-CM

## 2022-06-24 RX ORDER — ESCITALOPRAM OXALATE 10 MG/1
10 TABLET ORAL DAILY
Qty: 15 TABLET | Refills: 0 | Status: SHIPPED | OUTPATIENT
Start: 2022-06-24 | End: 2022-07-22

## 2022-06-24 RX ORDER — METOPROLOL SUCCINATE 25 MG/1
25 TABLET, EXTENDED RELEASE ORAL DAILY
Qty: 30 TABLET | Refills: 0 | Status: SHIPPED | OUTPATIENT
Start: 2022-06-24 | End: 2022-07-22

## 2022-06-24 NOTE — TELEPHONE ENCOUNTER
Chana from [facility] calling for metoprolol 25 mg daily to be send to the pharmacy.    Informed that it is not on medication list.    Chana states that patient has been taking it after discharged from the hospital in November.    [facility] is not on Epic. One attached is incorrect. Patient is using one below:    [address], [place] [address]    Phone: [phone]  Fax: [phone]    Medication can be faxed.    Patient recently moved into their facility.  Address updated.  Patient is due for a visit.  Transferred to a .    Maria Esther Dorsey RN

## 2022-06-24 NOTE — TELEPHONE ENCOUNTER
Not seen since 9/25/2020 - given 15 tablets.  Needs a video visit with whomever considers his PCP

## 2022-06-24 NOTE — TELEPHONE ENCOUNTER
This writer attempted to contact patient on 06/24/22      Reason for call medication and left message.      If patient calls back:   Registered Nurse called. Follow Triage Call workflow        Saloni Hidalgo RN

## 2022-06-28 NOTE — TELEPHONE ENCOUNTER
Patient was at the ER yesterday.  Need Hosp follow up visit scheduled.    This writer attempted to contact patient on 06/28/22      Reason for call provider's message and left message.      If patient calls back:   Registered Nurse called. Follow Triage Call workflow        Saloni Hidalgo RN

## 2022-07-07 ENCOUNTER — TELEPHONE (OUTPATIENT)
Dept: FAMILY MEDICINE | Facility: CLINIC | Age: 37
End: 2022-07-07

## 2022-07-07 NOTE — TELEPHONE ENCOUNTER
Received a call from Chana, she is the RN at [facility].  The patient has an extensive history with psych.    Chana is reporting that the patient is refusing all medications, including meds for seizures,  until he can be placed on the medications that he used to be on.     Chana was not in front of the patient's chart or medications.  She did reach out to the patient's psych team, again, did not have that information in front of her.  Chana states if the patient has a seizure, they will call 911.    See patient is scheduled for an in clinic visit with Ivelisse Fam NP for 20 min on 7/18/22.    Patient has not been seen in clinic since 9/25/2020 by Dr. Hickman.    Isa Taylor RN, [facility]

## 2022-07-11 NOTE — TELEPHONE ENCOUNTER
Called JESU Zayas with patient's group Home    The patient took his medications on Friday, Saturday, and this morning, Monday.   Patient refused to take medications on Sunday.    Chana reports that the patient was seen by psychiatry on Thursday at the [facility].   His Psychiatrist is Dr. Rebeca Styles, but he saw another colleague that day.    Chana will call back with the medications that he is supposed to be taking and any appointment notes she will ask them to fax to our clinic.   Gave her the [facility] fax and phone number.      Isa Taylor RN, [facility]

## 2022-07-11 NOTE — TELEPHONE ENCOUNTER
Please change up coming appointment to 40 minutes. Okay to use up same day slot to leave patient in same time slot.    Please also call group home nurse back: find out if patient back to taking medications and/or if psychiatry has called them back. Agreed they need to be involved.     Thank you,  CHELA Zafar, NP-C  [facility]

## 2022-07-11 NOTE — TELEPHONE ENCOUNTER
Chana called back and said that she is still waiting to hear back from pt's psychiatry.     Attempted to contact the physiatry office and left a voicemail     To contact pt's psychiatry call [phone]  Fax: [phone]  Pt's is under the care of the [facility] team.      Rosie Bird RN  [facility]

## 2022-07-12 NOTE — TELEPHONE ENCOUNTER
Called Chana.   Someone was going to call her back from the [facility] clinic but has not heard yet.  She will call back the psychiatry clinic again.     Isa Taylor RN, [facility]

## 2022-07-22 ENCOUNTER — OFFICE VISIT (OUTPATIENT)
Dept: FAMILY MEDICINE | Facility: CLINIC | Age: 37
End: 2022-07-22
Payer: MEDICAID

## 2022-07-22 VITALS
HEIGHT: 67 IN | SYSTOLIC BLOOD PRESSURE: 120 MMHG | BODY MASS INDEX: 28.56 KG/M2 | OXYGEN SATURATION: 100 % | WEIGHT: 182 LBS | HEART RATE: 111 BPM | TEMPERATURE: 97.5 F | DIASTOLIC BLOOD PRESSURE: 81 MMHG | RESPIRATION RATE: 16 BRPM

## 2022-07-22 DIAGNOSIS — I10 ESSENTIAL HYPERTENSION: Primary | ICD-10-CM

## 2022-07-22 DIAGNOSIS — F60.2 ANTISOCIAL PERSONALITY DISORDER (H): ICD-10-CM

## 2022-07-22 DIAGNOSIS — F19.10 POLYSUBSTANCE ABUSE (H): ICD-10-CM

## 2022-07-22 DIAGNOSIS — F41.1 GAD (GENERALIZED ANXIETY DISORDER): ICD-10-CM

## 2022-07-22 DIAGNOSIS — F43.10 PTSD (POST-TRAUMATIC STRESS DISORDER): ICD-10-CM

## 2022-07-22 DIAGNOSIS — F25.0 SCHIZOAFFECTIVE DISORDER, BIPOLAR TYPE (H): ICD-10-CM

## 2022-07-22 PROBLEM — R56.9 SEIZURES (H): Status: RESOLVED | Noted: 2020-09-25 | Resolved: 2022-07-22

## 2022-07-22 PROBLEM — F90.9 ADHD (ATTENTION DEFICIT HYPERACTIVITY DISORDER): Status: RESOLVED | Noted: 2017-02-09 | Resolved: 2022-07-22

## 2022-07-22 PROBLEM — G40.909 SEIZURE DISORDER (H): Status: ACTIVE | Noted: 2020-09-25

## 2022-07-22 PROCEDURE — 99214 OFFICE O/P EST MOD 30 MIN: CPT | Performed by: INTERNAL MEDICINE

## 2022-07-22 RX ORDER — METOPROLOL SUCCINATE 25 MG/1
25 TABLET, EXTENDED RELEASE ORAL DAILY
Qty: 90 TABLET | Refills: 3 | Status: SHIPPED | OUTPATIENT
Start: 2022-07-22 | End: 2023-06-21

## 2022-07-22 RX ORDER — MIRTAZAPINE 15 MG/1
15 TABLET, FILM COATED ORAL AT BEDTIME
COMMUNITY
Start: 2022-07-22 | End: 2023-11-02

## 2022-07-22 RX ORDER — LANOLIN ALCOHOL/MO/W.PET/CERES
3 CREAM (GRAM) TOPICAL
COMMUNITY
Start: 2022-07-22 | End: 2022-08-05

## 2022-07-22 ASSESSMENT — PAIN SCALES - GENERAL: PAINLEVEL: NO PAIN (0)

## 2022-07-22 NOTE — PROGRESS NOTES
"  Assessment & Plan     1.  Essential hypertension being treated with low-dose metoprolol.  Patient requesting refill.  Refill provided.  Blood pressure under control today.  2.  Schizoaffective disorder bipolar type.  Under psychiatric care.  Currently in a group home setting.  3.  Antisocial personality disorder.  4.  PTSD.  5.  Generalized anxiety disorder.  6.  Polysubstance abuse history.         BMI:   Estimated body mass index is 28.51 kg/m  as calculated from the following:    Height as of this encounter: 1.702 m (5' 7\").    Weight as of this encounter: 82.6 kg (182 lb).       No follow-ups on file.    Gurjit Dalton MD  [facility] ISAK Dickinson is a 36 year old, presenting for the following health issues:  No chief complaint on file.      HPI     Pt here for med refills today.  He needs refills today of BP meds. Has been staying at group home since 5/6/22. Needs updated med orders and refills today.    Patient presented from group home.  Requesting refill of metoprolol for hypertension.  He is under psychiatric care.  He has extensive mental health history.  He was reviewed today in care everywhere.      Review of Systems   Constitutional, HEENT, cardiovascular, pulmonary, GI, , musculoskeletal, neuro, skin, endocrine and psych systems are negative, except as otherwise noted.      Objective    There were no vitals taken for this visit.  There is no height or weight on file to calculate BMI.  Physical Exam   GENERAL: healthy, alert and no distress  NECK: no adenopathy, no asymmetry, masses, or scars and thyroid normal to palpation  RESP: lungs clear to auscultation - no rales, rhonchi or wheezes  CV: regular rate and rhythm, normal S1 S2, no S3 or S4, no murmur, click or rub, no peripheral edema and peripheral pulses strong  ABDOMEN: soft, nontender, no hepatosplenomegaly, no masses and bowel sounds normal  MS: no gross musculoskeletal defects noted, no edema            "         .  ..

## 2022-07-28 ENCOUNTER — TELEPHONE (OUTPATIENT)
Dept: FAMILY MEDICINE | Facility: CLINIC | Age: 37
End: 2022-07-28

## 2022-08-01 ENCOUNTER — TELEPHONE (OUTPATIENT)
Dept: FAMILY MEDICINE | Facility: CLINIC | Age: 37
End: 2022-08-01

## 2022-08-01 DIAGNOSIS — R56.9 SEIZURES (H): ICD-10-CM

## 2022-08-01 DIAGNOSIS — G40.909 SEIZURE DISORDER (H): Primary | ICD-10-CM

## 2022-08-01 NOTE — TELEPHONE ENCOUNTER
Vik, a group home nurse is calling requesting Dilantin referral.    Informed that this medication was discontinued.    The nurse said that patient moved into their facility two months ago and was on Dilantin since then for his seizures.    Maria Esther Dorsey RN

## 2022-08-01 NOTE — TELEPHONE ENCOUNTER
I saw the patient once in the clinic for refill on his hypertension medication.  I am not involved in his psychiatric care.  The Dilantin must of been prescribed by the psychiatrist/behavioral health team.  This question should be addressed by them.  I do not know the psychiatry service that his family is here.

## 2022-08-02 NOTE — TELEPHONE ENCOUNTER
It looks like Dilantin was prescribed by Ivelisse Fam on 4/1/21. RN notes, the patient has not had a visit with this provider.     There is not a number for the group home nurse provided in this encounter.    RN attempted to call each number listed in patient's demographics but none of the numbers are for the group home. RN left message with patient on mobile number listed.     If Aneb calls clinic, please get a call back number for the group home, inquire who the patient sees for psychiatric care, and clarify what is needed for this encounter.     Isa Padron RN  [facility]

## 2022-08-03 NOTE — TELEPHONE ENCOUNTER
"In reviewing patient's chart, there is a telephone encounter 7/7/22 from Chana RN, [facility], phone number [phone]    Call to Chana - \"voice mail not set up yet\", unable to leave message      Bekah MAY, RN      "

## 2022-08-04 RX ORDER — BUPROPION HYDROCHLORIDE 150 MG/1
TABLET ORAL
Qty: 28 TABLET | OUTPATIENT
Start: 2022-08-04

## 2022-08-04 NOTE — TELEPHONE ENCOUNTER
Called Chana.  Voicemail not set up yet.   Unable to leave message.    See in telephone encounter on 7/7/22, JESU Zayas with patient's group home, she  called the patient's psychiatry team at [facility] regarding his medications.  His psychiatrist is Dr. Rebeca Styles    To contact pt's psychiatry call [phone]  Fax: [phone]  Pt's is under the care of the [facility] team.    Does not indicate confidential voicemail.  When Chana calls back, please have her reach the Psych clinic.    Isa Taylor RN, [facility]

## 2022-08-05 RX ORDER — PHENYTOIN 50 MG/1
TABLET, CHEWABLE ORAL
Qty: 60 TABLET | Refills: 1 | Status: SHIPPED | OUTPATIENT
Start: 2022-08-05 | End: 2022-08-10

## 2022-08-05 RX ORDER — LANOLIN ALCOHOL/MO/W.PET/CERES
6 CREAM (GRAM) TOPICAL
Qty: 30 TABLET | Refills: 0 | COMMUNITY
Start: 2022-08-05 | End: 2024-07-30

## 2022-08-05 RX ORDER — QUETIAPINE FUMARATE 50 MG/1
200 TABLET, FILM COATED ORAL 2 TIMES DAILY
Qty: 30 TABLET | Refills: 0 | COMMUNITY
Start: 2022-08-05

## 2022-08-05 RX ORDER — LANOLIN ALCOHOL/MO/W.PET/CERES
6 CREAM (GRAM) TOPICAL
COMMUNITY
Start: 2022-08-05

## 2022-08-05 NOTE — TELEPHONE ENCOUNTER
Called Chana.  Gave message per Dr. Dalton.  She agrees to plan, need to do the lab appointment earlier next week before he is out of the medication.    Isa Taylor RN, [facility]

## 2022-08-05 NOTE — TELEPHONE ENCOUNTER
If the psychiatry service did not order the Dilantin, but I do not know who did it.  The dose he is on is very low 50 mg twice a day.  In his medical record I do not see a reason for use of Dilantin.  I recommend the Dilantin be discontinued.

## 2022-08-05 NOTE — TELEPHONE ENCOUNTER
Called Chana. She states she thinks the patient would benefit from a neurology referral since he has the diagnosis of Seizure Disorder in his chart.    Chana cannot find who this patient has seen in the past as he is new to this [facility] home.    Routing to Dr. Dalton to advise.    Isa Taylor RN, [facility]

## 2022-08-05 NOTE — TELEPHONE ENCOUNTER
Vik called.    She wanted to know if the patient should still take escitalopram that Dr. Dalton gave this patient.    Patient had seen Psychiatry last week and was prescribed a new Rx of quetiapine (Seroquel) 50 mg 2 times daily by mouth.  Added to patient list as Historical.    Asked Vik to discuss Dilantin medication and escitalopram with psychiatry.    She agrees to this plan.    Also let Vik know, her voicemail is not set up so people cannot leave messages.  She was glad to know this and will set it up.    Isa Taylor RN, [facility]

## 2022-08-05 NOTE — TELEPHONE ENCOUNTER
I have reviewed the patient's medical record.  There is documentation in 2017 indicating there was a history of seizure in nursing home.  None witnessed in hospital setting.  It was concluded that he possibly could have had alcohol withdrawal seizure.  Seems like he has been on Dilantin and different doses since.  Currently on Dilantin 50 mg twice a day.  I have place an order for Dilantin level.  After that we will decide if we should wean him off Dilantin.  I believe he does not have access to alcohol anymore.  Please have Dilantin level done in the morning before he takes the dose.

## 2022-08-05 NOTE — TELEPHONE ENCOUNTER
Received a call from Rebeca Styles, Psych NP  With [facility]    Discussed Medications and reconciled the medication list.    Added Melatonin 6 mg at bedtime as needed for sleep    Discontinued Escitalopram.  Patient is not taking this.    Psychiatry is not prescribing Phenytoin (DILANTIN) this is out of their scope.     This would need to come from Neurology.  Routing to Dr. Dalton to advise.    Isa Taylor RN, [facility]

## 2022-08-09 ENCOUNTER — TELEPHONE (OUTPATIENT)
Dept: FAMILY MEDICINE | Facility: CLINIC | Age: 37
End: 2022-08-09

## 2022-08-09 NOTE — TELEPHONE ENCOUNTER
Routed to provider for FYI    Patients group home staff contacted clinic to notify us that patient refused to have his lab work done today. Staff is worried that patient will run out of medications and is wondering what to do next.     Katja Del Toro RN, BSN   [facility]

## 2022-08-10 ENCOUNTER — LAB (OUTPATIENT)
Dept: LAB | Facility: CLINIC | Age: 37
End: 2022-08-10
Payer: MEDICAID

## 2022-08-10 DIAGNOSIS — R56.9 SEIZURES (H): ICD-10-CM

## 2022-08-10 LAB — PHENYTOIN SERPL-MCNC: 1.4 UG/ML

## 2022-08-10 PROCEDURE — 36415 COLL VENOUS BLD VENIPUNCTURE: CPT

## 2022-08-10 PROCEDURE — 80185 ASSAY OF PHENYTOIN TOTAL: CPT

## 2022-08-10 RX ORDER — PHENYTOIN 50 MG/1
TABLET, CHEWABLE ORAL
Qty: 60 TABLET | Refills: 0 | Status: SHIPPED | OUTPATIENT
Start: 2022-08-10 | End: 2023-08-14

## 2022-08-10 NOTE — TELEPHONE ENCOUNTER
I will refill his Dilantin for a month but see if patient could be convinced to have his Dilantin level checked in the interim.  If he declines to have Dilantin level done within these 30 days then I will consider stopping Dilantin altogether.

## 2022-08-11 ENCOUNTER — MEDICAL CORRESPONDENCE (OUTPATIENT)
Dept: HEALTH INFORMATION MANAGEMENT | Facility: CLINIC | Age: 37
End: 2022-08-11

## 2022-08-11 NOTE — TELEPHONE ENCOUNTER
Called Chana, the Patient had his Dilantin level checked on Wednesday and is taking the Dilantin from the refill from Dr. Dalton.    Routing to Dr. Dalton to please review when able.    Phenytoin level is 1.4    Isa Taylor RN, [facility]

## 2022-08-11 NOTE — TELEPHONE ENCOUNTER
See the Phenytoin level is in the patient's chart from our lab yesterday, 8/10/22.    Called JESU Zayas. Left message with Phone number to call back.    Isa Taylor RN, [facility]

## 2022-08-11 NOTE — TELEPHONE ENCOUNTER
**Addended entry at 4:19 pm:  Writer viewed provider additional comments and message below regarding now wanting to discontinue Dilantin completely.  Writer called back to Chana at  and updated with this new order, discontinue Dilantin and will see how patient does, per PCP.  Chana will adjust her written order then to note medication discontinuation and will fax this to [facility] for provider to sign.    Lidia Philippe RN  [facility]          Chana returned call to clinic. Relayed provider message and instructions, as noted below.  Instructed to reduce Dilantin to 50 mg daily for next couple weeks and then can stop. (Chana confirmed pt has been taking 50 mg twice daily)  Chana agreed with plan. She will create written order and fax to [facility] to have provider sign off on medication dose adjustment and orders to stop after couple weeks.    Lidia Philippe RN  [facility]

## 2022-08-11 NOTE — TELEPHONE ENCOUNTER
The Dilantin level is subtherapeutic at only 1.4.  So at this point I would like to have the Dilantin discontinued and see how he does without it.  Please notify group home

## 2022-08-11 NOTE — TELEPHONE ENCOUNTER
Please have them send us a fax copy of the Dilantin level for our record.  The Dilantin level is quite low so at this point I recommend that the Dilantin be reduced to 50 mg once a day for the next couple of weeks and then stop.

## 2022-08-12 ENCOUNTER — TELEPHONE (OUTPATIENT)
Dept: FAMILY MEDICINE | Facility: CLINIC | Age: 37
End: 2022-08-12

## 2022-08-12 NOTE — TELEPHONE ENCOUNTER
Completed form was faxed today 8/12/22 to [facility] @ [phone]. Copy placed in forms folder and original sent for scanning.  Trish Aquino CMA

## 2023-02-06 ENCOUNTER — TELEPHONE (OUTPATIENT)
Dept: FAMILY MEDICINE | Facility: CLINIC | Age: 38
End: 2023-02-06
Payer: MEDICAID

## 2023-02-06 NOTE — TELEPHONE ENCOUNTER
It could be from smoking plus caffeine.   Continue to monitor HR at her visits. Call us if patient becomes symptomatic.

## 2023-02-06 NOTE — TELEPHONE ENCOUNTER
Chana from [facility] calling to give patient update.    Chana states that patient has had elevated heart rate this weekend. Reporting -116, /87, and O2 sats 97%. Chana taken multiple HR readings and they were elevated. Reported that patient was not doing anything active during those readings, and states that they tried to get patient to go into UC/ED due to readings but patient refused to go. She also mentions that patient has been smoking a lot and drinking caffeine.    RN inquired if patient has any other symptoms. Patient does not have any other symptoms, no fever, shortness of breath, or chest pain. She is just concerned about elevated heart rate and would like to know what next steps are needed for the patient.     Routing to provider to review and advise.    Jennifer Lopez RN  [facility]

## 2023-02-06 NOTE — TELEPHONE ENCOUNTER
Called and spoke to Chana, writer relayed provider's message below. Chana verbalized understanding.     JESU Black  [facility]

## 2023-03-29 DIAGNOSIS — Z91.030 BEE STING ALLERGY: ICD-10-CM

## 2023-03-29 RX ORDER — EPINEPHRINE 0.3 MG/.3ML
INJECTION SUBCUTANEOUS
Qty: 2 EACH | Refills: 1 | Status: SHIPPED | OUTPATIENT
Start: 2023-03-29

## 2023-05-30 ENCOUNTER — TELEPHONE (OUTPATIENT)
Dept: FAMILY MEDICINE | Facility: CLINIC | Age: 38
End: 2023-05-30
Payer: MEDICAID

## 2023-05-30 NOTE — TELEPHONE ENCOUNTER
RN called Chana and relayed provider message below. Chana verbalized good understanding. No further questions or concerns.    JESU Rodriguez  [facility] T[facility]

## 2023-05-30 NOTE — TELEPHONE ENCOUNTER
Chana calling from patient's group home to update provider regarding patient's medication adherence concerns. Chana reports that there are days where patient refuses to take his medications, including Metoprolol. States that recently he didn't take his medication in the last three days. Group home has been monitoring his BP weekly and on days that he doesn't take his medication.     Reported BP are as follows.  Weekly BP check 5/12: 142/93  Refused med, BP check 5/14: 140/88  Refused med, BP check 5/24: 140/90  Refused med, BP check 5/27: 139/90    Patient's psychiatrist and  is also aware of this and will be holding a care conference with patient regarding concern. Date to be determined.     Patient does have upcoming annual physical with provider on 6/27/2023. Chana states she will try her best to make sure patient makes it to appointment.    Routing to provider as an update.     JESU Rodriguez  [facility]

## 2023-06-26 ENCOUNTER — TELEPHONE (OUTPATIENT)
Dept: FAMILY MEDICINE | Facility: CLINIC | Age: 38
End: 2023-06-26
Payer: MEDICAID

## 2023-06-26 NOTE — TELEPHONE ENCOUNTER
Reason for Call:  Appointment Request    Patient requesting this type of appt:  Preventive     Requested provider: any    Reason patient unable to be scheduled: Not within requested timeframe    When does patient want to be seen/preferred time: if anything opens up in June or July prior to his August appointment.    Comments: please call if anything opens up sooner then his appt in August    Okay to leave a detailed message?: Yes at Other phone number:  [phone] michael Macdonald    Call taken on 6/26/2023 at 10:42 AM by Sushila Guajardo    .

## 2023-08-14 ENCOUNTER — OFFICE VISIT (OUTPATIENT)
Dept: FAMILY MEDICINE | Facility: CLINIC | Age: 38
End: 2023-08-14
Payer: MEDICAID

## 2023-08-14 VITALS
WEIGHT: 203.3 LBS | TEMPERATURE: 97.8 F | SYSTOLIC BLOOD PRESSURE: 117 MMHG | HEART RATE: 88 BPM | BODY MASS INDEX: 31.91 KG/M2 | RESPIRATION RATE: 15 BRPM | HEIGHT: 67 IN | DIASTOLIC BLOOD PRESSURE: 83 MMHG | OXYGEN SATURATION: 99 %

## 2023-08-14 DIAGNOSIS — Z00.00 ROUTINE GENERAL MEDICAL EXAMINATION AT A HEALTH CARE FACILITY: ICD-10-CM

## 2023-08-14 DIAGNOSIS — Z11.59 NEED FOR HEPATITIS C SCREENING TEST: ICD-10-CM

## 2023-08-14 DIAGNOSIS — Z13.1 SCREENING FOR DIABETES MELLITUS: ICD-10-CM

## 2023-08-14 DIAGNOSIS — Z13.220 SCREENING FOR HYPERLIPIDEMIA: ICD-10-CM

## 2023-08-14 DIAGNOSIS — M21.371 RIGHT FOOT DROP: ICD-10-CM

## 2023-08-14 DIAGNOSIS — Z11.4 SCREENING FOR HIV (HUMAN IMMUNODEFICIENCY VIRUS): Primary | ICD-10-CM

## 2023-08-14 LAB
ANION GAP SERPL CALCULATED.3IONS-SCNC: 10 MMOL/L (ref 7–15)
BUN SERPL-MCNC: 11 MG/DL (ref 6–20)
CALCIUM SERPL-MCNC: 9.4 MG/DL (ref 8.6–10)
CHLORIDE SERPL-SCNC: 105 MMOL/L (ref 98–107)
CHOLEST SERPL-MCNC: 267 MG/DL
CREAT SERPL-MCNC: 0.91 MG/DL (ref 0.67–1.17)
DEPRECATED HCO3 PLAS-SCNC: 22 MMOL/L (ref 22–29)
GFR SERPL CREATININE-BSD FRML MDRD: >90 ML/MIN/1.73M2
GLUCOSE SERPL-MCNC: 97 MG/DL (ref 70–99)
HBA1C MFR BLD: 5.6 % (ref 0–5.6)
HCV AB SERPL QL IA: NONREACTIVE
HDLC SERPL-MCNC: 46 MG/DL
HIV 1+2 AB+HIV1 P24 AG SERPL QL IA: NONREACTIVE
LDLC SERPL CALC-MCNC: 185 MG/DL
NONHDLC SERPL-MCNC: 221 MG/DL
POTASSIUM SERPL-SCNC: 4.4 MMOL/L (ref 3.4–5.3)
SODIUM SERPL-SCNC: 137 MMOL/L (ref 136–145)
TRIGL SERPL-MCNC: 180 MG/DL

## 2023-08-14 PROCEDURE — 80061 LIPID PANEL: CPT | Performed by: INTERNAL MEDICINE

## 2023-08-14 PROCEDURE — 99213 OFFICE O/P EST LOW 20 MIN: CPT | Mod: 25 | Performed by: INTERNAL MEDICINE

## 2023-08-14 PROCEDURE — 80048 BASIC METABOLIC PNL TOTAL CA: CPT | Performed by: INTERNAL MEDICINE

## 2023-08-14 PROCEDURE — 36415 COLL VENOUS BLD VENIPUNCTURE: CPT | Performed by: INTERNAL MEDICINE

## 2023-08-14 PROCEDURE — 83036 HEMOGLOBIN GLYCOSYLATED A1C: CPT | Performed by: INTERNAL MEDICINE

## 2023-08-14 PROCEDURE — 99395 PREV VISIT EST AGE 18-39: CPT | Performed by: INTERNAL MEDICINE

## 2023-08-14 PROCEDURE — 86803 HEPATITIS C AB TEST: CPT | Performed by: INTERNAL MEDICINE

## 2023-08-14 PROCEDURE — 87389 HIV-1 AG W/HIV-1&-2 AB AG IA: CPT | Performed by: INTERNAL MEDICINE

## 2023-08-14 RX ORDER — GABAPENTIN 300 MG/1
300 CAPSULE ORAL 3 TIMES DAILY
COMMUNITY

## 2023-08-14 ASSESSMENT — ENCOUNTER SYMPTOMS
FREQUENCY: 0
COUGH: 0
ARTHRALGIAS: 0
WEAKNESS: 0
CONSTIPATION: 0
CHILLS: 0
EYE PAIN: 0
DYSURIA: 0
ABDOMINAL PAIN: 0
JOINT SWELLING: 0
MYALGIAS: 0
HEMATURIA: 0
HEARTBURN: 0
SHORTNESS OF BREATH: 0
PALPITATIONS: 0
HEADACHES: 0
NERVOUS/ANXIOUS: 1
DIZZINESS: 0
SORE THROAT: 0
FEVER: 0
DIARRHEA: 0
NAUSEA: 0
PARESTHESIAS: 0
HEMATOCHEZIA: 0

## 2023-08-14 ASSESSMENT — PAIN SCALES - GENERAL: PAINLEVEL: NO PAIN (0)

## 2023-08-14 NOTE — LETTER
August 15, 2023      Alok Aquino  [address]  [place] [address]        Dear ,    We are writing to inform you of your test results.    Mr. Aquino,     Your LDL (bad cholesterol)  was above goal.  Genetics, diet, weight and low exercise levels can contribute to this. Your HDL (good cholesterol) was normal.  This is good. Your triglycerides were normal. Elevated LDL cholesterol and triglycerides as well as low HDL cholesterol all increase a person's risk for heart and vascular disease. Maintaining a healthy diet with lean proteins, whole grains and healthy fats such as olive oil as well as regular exercise and maintaining an appropriate weight all contribute to healthier cholesterol levels.   Kidney function tests are normal   Hepatitis C and HIV tests are normal   Hemoglobin A1c is normal indicating no prediabetes or diabetes     Please contact the clinic if you have additional questions.  Thank you.     Resulted Orders   HIV Antigen Antibody Combo   Result Value Ref Range    HIV Antigen Antibody Combo Nonreactive Nonreactive      Comment:      HIV-1 p24 Ag & HIV-1/HIV-2 Ab Not Detected   Hepatitis C Screen Reflex to HCV RNA Quant and Genotype   Result Value Ref Range    Hepatitis C Antibody Nonreactive Nonreactive    Narrative    Assay performance characteristics have not been established for newborns, infants, and children.   Basic metabolic panel  (Ca, Cl, CO2, Creat, Gluc, K, Na, BUN)   Result Value Ref Range    Sodium 137 136 - 145 mmol/L    Potassium 4.4 3.4 - 5.3 mmol/L    Chloride 105 98 - 107 mmol/L    Carbon Dioxide (CO2) 22 22 - 29 mmol/L    Anion Gap 10 7 - 15 mmol/L    Urea Nitrogen 11.0 6.0 - 20.0 mg/dL    Creatinine 0.91 0.67 - 1.17 mg/dL    Calcium 9.4 8.6 - 10.0 mg/dL    Glucose 97 70 - 99 mg/dL    GFR Estimate >90 >60 mL/min/1.73m2   Hemoglobin A1c   Result Value Ref Range    Hemoglobin A1C 5.6 0.0 - 5.6 %      Comment:      Normal <5.7%   Prediabetes 5.7-6.4%    Diabetes  6.5% or higher     Note: Adopted from ADA consensus guidelines.   Lipid panel reflex to direct LDL Fasting   Result Value Ref Range    Cholesterol 267 (H) <200 mg/dL    Triglycerides 180 (H) <150 mg/dL    Direct Measure HDL 46 >=40 mg/dL    LDL Cholesterol Calculated 185 (H) <=100 mg/dL    Non HDL Cholesterol 221 (H) <130 mg/dL    Narrative    Cholesterol  Desirable:  <200 mg/dL    Triglycerides  Normal:  Less than 150 mg/dL  Borderline High:  150-199 mg/dL  High:  200-499 mg/dL  Very High:  Greater than or equal to 500 mg/dL    Direct Measure HDL  Female:  Greater than or equal to 50 mg/dL   Male:  Greater than or equal to 40 mg/dL    LDL Cholesterol  Desirable:  <100mg/dL  Above Desirable:  100-129 mg/dL   Borderline High:  130-159 mg/dL   High:  160-189 mg/dL   Very High:  >= 190 mg/dL    Non HDL Cholesterol  Desirable:  130 mg/dL  Above Desirable:  130-159 mg/dL  Borderline High:  160-189 mg/dL  High:  190-219 mg/dL  Very High:  Greater than or equal to 220 mg/dL       If you have any questions or concerns, please call the clinic at the number listed above.       Sincerely,      Jon Hickman MD
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
## 2023-08-15 DIAGNOSIS — I10 ESSENTIAL HYPERTENSION: ICD-10-CM

## 2023-08-15 RX ORDER — METOPROLOL SUCCINATE 25 MG/1
TABLET, EXTENDED RELEASE ORAL
Qty: 30 TABLET | Refills: 0 | Status: SHIPPED | OUTPATIENT
Start: 2023-08-15 | End: 2023-09-11

## 2023-08-15 NOTE — TELEPHONE ENCOUNTER
Patient seen at [facility] 8/14/23 by Jon Hickman MD. Protocol not updated, approving prescription

## 2023-09-11 DIAGNOSIS — I10 ESSENTIAL HYPERTENSION: ICD-10-CM

## 2023-09-11 RX ORDER — METOPROLOL SUCCINATE 25 MG/1
TABLET, EXTENDED RELEASE ORAL
Qty: 30 TABLET | Refills: 0 | Status: SHIPPED | OUTPATIENT
Start: 2023-09-11 | End: 2023-10-09

## 2023-09-17 NOTE — TELEPHONE ENCOUNTER
Action September 16, 2023 9:45 PM MT   Action Taken Sent a request for imaging from [facility].       DIAGNOSIS: RT Foot Drop   APPOINTMENT DATE: 10/09/2023   NOTES STATUS DETAILS   OFFICE NOTE from referring provider Internal 08/14/2023 - Jon Hickman MD - [facility]   OFFICE NOTE from other specialist Internal 10/29/2020 - Maicol Preciado DO - [facility]   MEDICATION LIST Internal    LABS     XRAYS (IMAGES & REPORTS) PACS [facility]:  09/30/2014 - RT Ankle  06/04/2012 - RT Tib-Fib

## 2023-10-09 ENCOUNTER — PRE VISIT (OUTPATIENT)
Dept: ORTHOPEDICS | Facility: CLINIC | Age: 38
End: 2023-10-09

## 2023-10-09 ENCOUNTER — OFFICE VISIT (OUTPATIENT)
Dept: ORTHOPEDICS | Facility: CLINIC | Age: 38
End: 2023-10-09
Payer: MEDICAID

## 2023-10-09 DIAGNOSIS — B35.1 ONYCHOMYCOSIS: ICD-10-CM

## 2023-10-09 DIAGNOSIS — M79.671 RIGHT FOOT PAIN: ICD-10-CM

## 2023-10-09 DIAGNOSIS — M21.371 RIGHT FOOT DROP: ICD-10-CM

## 2023-10-09 DIAGNOSIS — I10 ESSENTIAL HYPERTENSION: ICD-10-CM

## 2023-10-09 DIAGNOSIS — B35.3 TINEA PEDIS OF BOTH FEET: Primary | ICD-10-CM

## 2023-10-09 DIAGNOSIS — M25.541 ARTHRALGIA OF RIGHT HAND: ICD-10-CM

## 2023-10-09 PROCEDURE — 99204 OFFICE O/P NEW MOD 45 MIN: CPT | Performed by: PODIATRIST

## 2023-10-09 RX ORDER — NYSTATIN AND TRIAMCINOLONE ACETONIDE 100000; 1 [USP'U]/G; MG/G
CREAM TOPICAL 2 TIMES DAILY
Qty: 60 G | Refills: 5 | Status: SHIPPED | OUTPATIENT
Start: 2023-10-09

## 2023-10-09 RX ORDER — METOPROLOL SUCCINATE 25 MG/1
TABLET, EXTENDED RELEASE ORAL
Qty: 90 TABLET | Refills: 0 | Status: SHIPPED | OUTPATIENT
Start: 2023-10-09 | End: 2023-12-22

## 2023-10-09 RX ORDER — METFORMIN HCL 500 MG
TABLET, EXTENDED RELEASE 24 HR ORAL
COMMUNITY
Start: 2023-09-01 | End: 2023-11-02

## 2023-10-09 ASSESSMENT — ENCOUNTER SYMPTOMS
MUSCLE WEAKNESS: 1
HEADACHES: 0
NIGHT SWEATS: 0
NECK PAIN: 1
EYE REDNESS: 0
WEIGHT GAIN: 0
POLYDIPSIA: 0
HOARSE VOICE: 0
NECK MASS: 0
ALTERED TEMPERATURE REGULATION: 0
INSOMNIA: 1
SORE THROAT: 0
LOSS OF CONSCIOUSNESS: 0
MUSCLE CRAMPS: 1
JOINT SWELLING: 0
SPEECH CHANGE: 0
ARTHRALGIAS: 0
PANIC: 1
FATIGUE: 0
MYALGIAS: 1
NUMBNESS: 1
WEIGHT LOSS: 1
POLYPHAGIA: 1
TREMORS: 1
EYE IRRITATION: 1
DISTURBANCES IN COORDINATION: 0
TASTE DISTURBANCE: 0
INCREASED ENERGY: 1
SINUS CONGESTION: 0
EYE PAIN: 1
EYE WATERING: 1
DEPRESSION: 1
HALLUCINATIONS: 1
DECREASED CONCENTRATION: 1
WEAKNESS: 1
CHILLS: 0
BACK PAIN: 1
SMELL DISTURBANCE: 0
SEIZURES: 1
TINGLING: 1
DIZZINESS: 0
DECREASED APPETITE: 1
MEMORY LOSS: 0
DOUBLE VISION: 1
FEVER: 0
TROUBLE SWALLOWING: 0
STIFFNESS: 1
PARALYSIS: 0
SINUS PAIN: 0
NERVOUS/ANXIOUS: 1
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
## 2023-10-18 DIAGNOSIS — M79.641 RIGHT HAND PAIN: Primary | ICD-10-CM

## 2023-10-18 NOTE — TELEPHONE ENCOUNTER
Records Requested     October 18, 2023 2:10 PM   [id number] [facility]   Facility  [facility]   Outcome Urgent request sent to [facility].    All imaging received.       DIAGNOSIS: (R) hand/ pinky finger pain    APPOINTMENT DATE: 10/23/23   NOTES STATUS DETAILS   OFFICE NOTE from referring provider Internal 10/09/23 - Reji Lawson DPM    OFFICE NOTE from other specialist [facility]:  - 11/21/21 - Tenzin Urbano, PA   - 05/20/13 -  Kelli Corrales, NP  - 06/30/12 - Franko Stallworth MD   - 12/25/11 - Struck, Kjersten W, NP   - 10/29/11 - Consuelo Golden, PAYTON   - 08/04/11 - Adrian Velasquez MD     [facility]:  - 02/19/20 - Branden Hargrove DO    MEDICATION LIST Internal    XRAYS  & INJECTIONS (IMAGES & REPORTS) PACS [facility]:  - XR R HAND  - 11/21/21 - Tenzin Urbano, PA    - 05/20/13 - Kelli Corrales, NP  - 07/01/12 - Franko Stallworth MD        [facility]:  - XR R HAND  - 02/19/20 - Branden Hargrove DO           given to family

## 2023-10-23 ENCOUNTER — ANCILLARY PROCEDURE (OUTPATIENT)
Dept: GENERAL RADIOLOGY | Facility: CLINIC | Age: 38
End: 2023-10-23
Attending: FAMILY MEDICINE
Payer: MEDICAID

## 2023-10-23 ENCOUNTER — OFFICE VISIT (OUTPATIENT)
Dept: ORTHOPEDICS | Facility: CLINIC | Age: 38
End: 2023-10-23
Attending: PODIATRIST
Payer: MEDICAID

## 2023-10-23 ENCOUNTER — PRE VISIT (OUTPATIENT)
Dept: ORTHOPEDICS | Facility: CLINIC | Age: 38
End: 2023-10-23

## 2023-10-23 DIAGNOSIS — S69.91XS FINGER INJURY, RIGHT, SEQUELA: Primary | ICD-10-CM

## 2023-10-23 DIAGNOSIS — M79.641 RIGHT HAND PAIN: ICD-10-CM

## 2023-10-23 PROCEDURE — 99213 OFFICE O/P EST LOW 20 MIN: CPT | Performed by: FAMILY MEDICINE

## 2023-10-23 PROCEDURE — 73130 X-RAY EXAM OF HAND: CPT | Mod: RT | Performed by: RADIOLOGY
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
## 2023-10-24 ENCOUNTER — THERAPY VISIT (OUTPATIENT)
Dept: OCCUPATIONAL THERAPY | Facility: CLINIC | Age: 38
End: 2023-10-24
Payer: MEDICAID

## 2023-10-24 DIAGNOSIS — S69.91XS FINGER INJURY, RIGHT, SEQUELA: Primary | ICD-10-CM

## 2023-10-24 PROCEDURE — 97760 ORTHOTIC MGMT&TRAING 1ST ENC: CPT | Mod: GO | Performed by: OCCUPATIONAL THERAPIST

## 2023-10-24 PROCEDURE — 97165 OT EVAL LOW COMPLEX 30 MIN: CPT | Mod: GO | Performed by: OCCUPATIONAL THERAPIST
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
## 2023-11-01 NOTE — TELEPHONE ENCOUNTER
DIAGNOSIS: Right Hand - Finger Injury   APPOINTMENT DATE: 11/08/2023   NOTES STATUS DETAILS   OFFICE NOTE from referring provider Internal 10/23/2023 - Dontae Head MD - [facility]   OFFICE NOTE from other specialist Care Everywhere    XRAYS (IMAGES & REPORTS) PACS Internal    [facility]:  - XR R HAND  - 11/21/21   - 05/20/13  - 07/01/12     [facility]  [facility]  - [facility]/19[facility]

## 2023-11-02 ENCOUNTER — OFFICE VISIT (OUTPATIENT)
Dept: FAMILY MEDICINE | Facility: CLINIC | Age: 38
End: 2023-11-02
Payer: MEDICAID

## 2023-11-02 ENCOUNTER — ANCILLARY PROCEDURE (OUTPATIENT)
Dept: GENERAL RADIOLOGY | Facility: CLINIC | Age: 38
End: 2023-11-02
Attending: INTERNAL MEDICINE
Payer: MEDICAID

## 2023-11-02 VITALS
WEIGHT: 200 LBS | BODY MASS INDEX: 31.39 KG/M2 | HEIGHT: 67 IN | HEART RATE: 115 BPM | OXYGEN SATURATION: 96 % | TEMPERATURE: 98.6 F | DIASTOLIC BLOOD PRESSURE: 79 MMHG | RESPIRATION RATE: 18 BRPM | SYSTOLIC BLOOD PRESSURE: 123 MMHG

## 2023-11-02 DIAGNOSIS — M54.50 CHRONIC BILATERAL LOW BACK PAIN WITHOUT SCIATICA: ICD-10-CM

## 2023-11-02 DIAGNOSIS — G89.29 CHRONIC BILATERAL LOW BACK PAIN WITHOUT SCIATICA: Primary | ICD-10-CM

## 2023-11-02 DIAGNOSIS — F99 INSOMNIA DUE TO OTHER MENTAL DISORDER: ICD-10-CM

## 2023-11-02 DIAGNOSIS — F19.10 POLYSUBSTANCE ABUSE (H): ICD-10-CM

## 2023-11-02 DIAGNOSIS — G40.909 SEIZURE DISORDER (H): ICD-10-CM

## 2023-11-02 DIAGNOSIS — F10.939 ALCOHOL WITHDRAWAL SEIZURE WITH COMPLICATION (H): ICD-10-CM

## 2023-11-02 DIAGNOSIS — I10 ESSENTIAL HYPERTENSION: ICD-10-CM

## 2023-11-02 DIAGNOSIS — F25.0 SCHIZOAFFECTIVE DISORDER, BIPOLAR TYPE (H): ICD-10-CM

## 2023-11-02 DIAGNOSIS — R56.9 ALCOHOL WITHDRAWAL SEIZURE WITH COMPLICATION (H): ICD-10-CM

## 2023-11-02 DIAGNOSIS — F51.05 INSOMNIA DUE TO OTHER MENTAL DISORDER: ICD-10-CM

## 2023-11-02 DIAGNOSIS — R42 DIZZINESS: ICD-10-CM

## 2023-11-02 DIAGNOSIS — E66.09 CLASS 1 OBESITY DUE TO EXCESS CALORIES WITH SERIOUS COMORBIDITY AND BODY MASS INDEX (BMI) OF 31.0 TO 31.9 IN ADULT: ICD-10-CM

## 2023-11-02 DIAGNOSIS — E78.00 HYPERCHOLESTEROLEMIA: ICD-10-CM

## 2023-11-02 DIAGNOSIS — G89.29 CHRONIC BILATERAL LOW BACK PAIN WITHOUT SCIATICA: ICD-10-CM

## 2023-11-02 DIAGNOSIS — E66.811 CLASS 1 OBESITY DUE TO EXCESS CALORIES WITH SERIOUS COMORBIDITY AND BODY MASS INDEX (BMI) OF 31.0 TO 31.9 IN ADULT: ICD-10-CM

## 2023-11-02 DIAGNOSIS — M54.50 CHRONIC BILATERAL LOW BACK PAIN WITHOUT SCIATICA: Primary | ICD-10-CM

## 2023-11-02 PROCEDURE — 72100 X-RAY EXAM L-S SPINE 2/3 VWS: CPT | Mod: TC | Performed by: RADIOLOGY

## 2023-11-02 PROCEDURE — 99214 OFFICE O/P EST MOD 30 MIN: CPT | Performed by: INTERNAL MEDICINE

## 2023-11-02 RX ORDER — TRAZODONE HYDROCHLORIDE 50 MG/1
100 TABLET, FILM COATED ORAL
COMMUNITY
Start: 2023-11-02

## 2023-11-02 RX ORDER — METFORMIN HCL 500 MG
500 TABLET, EXTENDED RELEASE 24 HR ORAL 2 TIMES DAILY WITH MEALS
Qty: 60 TABLET | Refills: 0 | Status: SHIPPED | OUTPATIENT
Start: 2023-11-02

## 2023-11-02 RX ORDER — MIRTAZAPINE 30 MG/1
30 TABLET, FILM COATED ORAL AT BEDTIME
COMMUNITY
Start: 2023-11-02

## 2023-11-02 ASSESSMENT — PAIN SCALES - GENERAL: PAINLEVEL: NO PAIN (0)

## 2023-11-02 ASSESSMENT — ENCOUNTER SYMPTOMS: SYNCOPE: 1

## 2023-11-02 NOTE — PROGRESS NOTES
"  Assessment & Plan     1.  Chronic bilateral low back pain without sciatica that only seem to bother him at night.  No issue with ambulation.  Will obtain x-ray of the lumbar spine and get back to the results.  2.  Essential hypertension controlled with not metoprolol 25 mg a day.  3.  Seizure disorder.  No evidence of recent seizures.  Looks like he probably had alcohol withdrawal seizure in the past based on review of medical records.  4.  Schizoaffective disorder bipolar type patient under psychiatric care.  5.  Class I obesity his BMI is 31.  He is on metformin 1 g a day for obesity.  Unclear who started him on diet.  6.  Insomnia due to mental health disorders.  Patient is taking mirtazapine.  7.  History of polysubstance abuse currently in group home and does not have access to drugs like marijuana or alcohol.  8.  Hypercholesterolemia.  9.  Dizziness episode yesterday.  Symptoms resolved.  1.  I will get back with the x-ray report.      BMI:   Estimated body mass index is 31.32 kg/m  as calculated from the following:    Height as of this encounter: 1.702 m (5' 7\").    Weight as of this encounter: 90.7 kg (200 lb).         Gurjit Dalton MD  [facility] CARLOZ Dickinson is a 37 year old, presenting for the following health issues:  Syncope (Fainting spell 11/1/2023 - was sent to [facility], however was not seen.  Xray needed on back for social security)      11/2/2023    12:50 PM   Additional Questions   Roomed by Ivelisse JONAS   Accompanied by Kalen Wiley     PT Here for Xray for Back - Social Security   Syncope     History of Present Illness       Reason for visit:  Xray    He eats 2-3 servings of fruits and vegetables daily.He consumes 2 sweetened beverage(s) daily.He exercises with enough effort to increase his heart rate 20 to 29 minutes per day.  He exercises with enough effort to increase his heart rate 3 or less days per week.   He is taking medications regularly.   " "      Dizziness  Onset/Duration: Fainted/Dizziness 11/1/2023  Description:   Do you feel faint: YES  Does it feel like the surroundings (bed, room) are moving: No  Unsteady/off balance: YES  Have you passed out or fallen: YES  Intensity: severe  Progression of Symptoms: improvingNot Currently Dizzy  Accompanying Signs & Symptoms:  Heart palpitations or chest pain: YES- Heart was racing  Nausea, vomiting: YES  Weakness or lack of coordination in arms or legs: YES- Legs  Vision or speech changes: No  Numbness or tingling: No  Ringing in ears (Tinnitus): No  Hearing Loss: No  History:   Head trauma/concussion history: No  Previous similar symptoms: YES-   Recent bleeding history: No  Any new medications (BP?): No  Precipitating factors:   Worse with activity: YES  Worse with head movement: YES  Alleviating factors:   Does staying in a fixed position give relief: YES  Therapies tried and outcome: Was taken to [facility] - However was not Seen.    37-year-old comes in from the group home indicating that he has low back pain at night but not during the daytime.  No radicular pain to lower extremity.  He did not offer any other concerns to me.  Also sounds like that yesterday he had a dizzy spell on.  No symptoms currently.      Review of Systems   Cardiovascular:  Positive for syncope.      Constitutional, HEENT, cardiovascular, pulmonary, GI, , musculoskeletal, neuro, skin, endocrine and psych systems are negative, except as otherwise noted.      Objective    /79 (BP Location: Right arm, Patient Position: Sitting, Cuff Size: Adult Large)   Pulse 115   Temp 98.6  F (37  C) (Oral)   Resp 18   Ht 1.702 m (5' 7\")   Wt 90.7 kg (200 lb)   SpO2 96%   BMI 31.32 kg/m    Body mass index is 31.32 kg/m .  Physical Exam   GENERAL: healthy, alert and no distress  NECK: no adenopathy, no asymmetry, masses, or scars and thyroid normal to palpation  RESP: lungs clear to auscultation - no rales, rhonchi or wheezes  CV: " regular rate and rhythm, normal S1 S2, no S3 or S4, no murmur, click or rub, no peripheral edema and peripheral pulses strong  ABDOMEN: soft, nontender, no hepatosplenomegaly, no masses and bowel sounds normal  MS: no gross musculoskeletal defects noted, no edema    X-ray of the lumbar spine performed today.  I will get back with results.

## 2023-11-08 ENCOUNTER — PRE VISIT (OUTPATIENT)
Dept: ORTHOPEDICS | Facility: CLINIC | Age: 38
End: 2023-11-08

## 2023-12-22 DIAGNOSIS — I10 ESSENTIAL HYPERTENSION: ICD-10-CM

## 2023-12-22 RX ORDER — METOPROLOL SUCCINATE 25 MG/1
TABLET, EXTENDED RELEASE ORAL
Qty: 30 TABLET | Refills: 0 | Status: SHIPPED | OUTPATIENT
Start: 2023-12-22 | End: 2024-01-29

## 2024-01-02 ENCOUNTER — TELEPHONE (OUTPATIENT)
Dept: FAMILY MEDICINE | Facility: CLINIC | Age: 39
End: 2024-01-02
Payer: MEDICAID

## 2024-01-02 NOTE — TELEPHONE ENCOUNTER
I do not agree with the wheelchair.  He was walking independently when last seen in the clinic.  His gait was stable.  I willing to reassess him and he can be placed in a same-day slot.

## 2024-01-02 NOTE — TELEPHONE ENCOUNTER
Patient has leg pain and feels the need to have a wheel chair. Is it OK that he is scheduled for 1/11 in a same day slot?

## 2024-01-29 DIAGNOSIS — I10 ESSENTIAL HYPERTENSION: ICD-10-CM

## 2024-01-29 RX ORDER — METOPROLOL SUCCINATE 25 MG/1
TABLET, EXTENDED RELEASE ORAL
Qty: 30 TABLET | Refills: 9 | Status: SHIPPED | OUTPATIENT
Start: 2024-01-29

## 2024-03-14 ENCOUNTER — TELEPHONE (OUTPATIENT)
Dept: FAMILY MEDICINE | Facility: CLINIC | Age: 39
End: 2024-03-14
Payer: MEDICAID

## 2024-03-14 NOTE — TELEPHONE ENCOUNTER
Forms/Letter Request    Type of form [facility]    Do we have the form/letter: Yes: placed form in providers in box for review/completion.    How would you like the form/letter returned: Fax : [phone]

## 2024-03-18 ENCOUNTER — MEDICAL CORRESPONDENCE (OUTPATIENT)
Dept: HEALTH INFORMATION MANAGEMENT | Facility: CLINIC | Age: 39
End: 2024-03-18
Payer: MEDICAID

## 2024-03-18 NOTE — TELEPHONE ENCOUNTER
Completed form has been faxed to [phone] . Right-Fax reviewed to confirm form was sent without error.   [facility]

## 2024-04-15 ENCOUNTER — MEDICAL CORRESPONDENCE (OUTPATIENT)
Dept: HEALTH INFORMATION MANAGEMENT | Facility: CLINIC | Age: 39
End: 2024-04-15
Payer: MEDICAID

## 2024-04-24 DIAGNOSIS — Z91.030 BEE STING ALLERGY: ICD-10-CM

## 2024-05-22 ENCOUNTER — OFFICE VISIT (OUTPATIENT)
Dept: FAMILY MEDICINE | Facility: CLINIC | Age: 39
End: 2024-05-22
Payer: MEDICAID

## 2024-05-22 VITALS
TEMPERATURE: 97.8 F | HEART RATE: 116 BPM | OXYGEN SATURATION: 97 % | WEIGHT: 191 LBS | SYSTOLIC BLOOD PRESSURE: 132 MMHG | DIASTOLIC BLOOD PRESSURE: 86 MMHG | RESPIRATION RATE: 13 BRPM | BODY MASS INDEX: 29.98 KG/M2 | HEIGHT: 67 IN

## 2024-05-22 DIAGNOSIS — B35.1 ONYCHOMYCOSIS DUE TO DERMATOPHYTE: Primary | ICD-10-CM

## 2024-05-22 DIAGNOSIS — I10 ESSENTIAL HYPERTENSION: ICD-10-CM

## 2024-05-22 DIAGNOSIS — F20.0 PARANOID SCHIZOPHRENIA (H): ICD-10-CM

## 2024-05-22 DIAGNOSIS — T78.40XA ALLERGIC REACTION, INITIAL ENCOUNTER: ICD-10-CM

## 2024-05-22 PROCEDURE — 99214 OFFICE O/P EST MOD 30 MIN: CPT | Performed by: INTERNAL MEDICINE

## 2024-05-22 ASSESSMENT — PATIENT HEALTH QUESTIONNAIRE - PHQ9
10. IF YOU CHECKED OFF ANY PROBLEMS, HOW DIFFICULT HAVE THESE PROBLEMS MADE IT FOR YOU TO DO YOUR WORK, TAKE CARE OF THINGS AT HOME, OR GET ALONG WITH OTHER PEOPLE: NOT DIFFICULT AT ALL
SUM OF ALL RESPONSES TO PHQ QUESTIONS 1-9: 10
SUM OF ALL RESPONSES TO PHQ QUESTIONS 1-9: 10

## 2024-05-22 ASSESSMENT — PAIN SCALES - GENERAL: PAINLEVEL: NO PAIN (0)

## 2024-05-22 NOTE — PROGRESS NOTES
"  Assessment & Plan     1.  Onychomycosis due to dermatophyte involving both great toenails.  Treatment options discussed including Lamisil.  Indicated treatments in the past have not worked.  Patient interested in having the toenails removed.  Referral to podiatry made.  2.  Allergic reactions.  He breaks site in rash off-and-on and uses Benadryl on a as needed basis.  Claims to be allergic to lavender detergents.  Requesting Benadryl refill which was provided for as needed use.  3.  Essential hypertension.  Blood pressure normal today.  4.  Paranoid schizophrenia for which she is under psychiatric care.  Patient brought in a form which she wanted me to sign indicating that he was capable of managing his own funds.  I informed him that I would not sign such a form since he has significant psychiatric history and that he should present that form to his psychiatrist.      BMI  Estimated body mass index is 29.91 kg/m  as calculated from the following:    Height as of this encounter: 1.702 m (5' 7\").    Weight as of this encounter: 86.6 kg (191 lb).       Depression Screening Follow Up        5/22/2024    10:42 AM   PHQ   PHQ-9 Total Score 10   Q9: Thoughts of better off dead/self-harm past 2 weeks Not at all           Follow Up Actions Taken       Margaux Dickinson is a 38 year old, presenting for the following health issues:  Fungal Infection (Toe/both)      5/22/2024    10:47 AM   Additional Questions   Roomed by Ayesha   Accompanied by group home helper         5/22/2024    10:47 AM   Patient Reported Additional Medications   Patient reports taking the following new medications no     History of Present Illness       Reason for visit:  Toe fungus  Symptom onset:  More than a month  Symptom intensity:  Severe  Symptom progression:  Staying the same  Had these symptoms before:  Yes  Has tried/received treatment for these symptoms:  No  What makes it worse:  No  What makes it better:  No    He eats 0-1 servings of " fruits and vegetables daily.He consumes 0 sweetened beverage(s) daily.He exercises with enough effort to increase his heart rate 9 or less minutes per day.  He exercises with enough effort to increase his heart rate 3 or less days per week. He is missing 1 dose(s) of medications per week.         Concern - Toe fungas  Onset: Since 10 years of age  Description: Fungus under nails   Intensity: mild  Progression of Symptoms:  improving  Accompanying Signs & Symptoms: Fungus under nails   Previous history of similar problem: yes  Precipitating factors:        Worsened by: N/A  Alleviating factors:        Improved by: N/A  Therapies tried and outcome: Cream    38-year-old gentleman who currently lives in a group home came in today accompanied by a group home employee.  He has a history of hypertension, paranoid schizophrenia, previous history of polysubstance abuse.  Under psychiatry care.  He came in today for couple reasons.  He has fungal infection of the great toes bilaterally with thickened nails and would like to have it treated.  In the past he indicated the treatments given to him have not worked.  I discussed Lamisil as a treatment with the chances of working 50-50.  He would rather have his nails removed.  He brought in a form that he wanted me to sign indicating that he was capable of handling his own funds.  Currently his funds are ended by a appointed person that does charge a monthly fee.  He feels that that reduces his final take-home funds because of the fees.  With his significant psychiatric problem, I am not certain that he is should be handling his own funds.  Inform patient that that should come from his psychiatrist.  Informs me that he was looking at changing his psychiatrist, partly because she would not sign off this form.    Review of Systems  Constitutional, neuro, ENT, endocrine, pulmonary, cardiac, gastrointestinal, genitourinary, musculoskeletal, integument and psychiatric systems are  "negative, except as otherwise noted.      Objective    /86 (BP Location: Right arm, Patient Position: Sitting, Cuff Size: Adult Regular)   Pulse 116   Temp 97.8  F (36.6  C) (Temporal)   Resp 13   Ht 1.702 m (5' 7\")   Wt 86.6 kg (191 lb)   SpO2 97%   BMI 29.91 kg/m    Body mass index is 29.91 kg/m .  Physical Exam   GENERAL: alert and no distress  MS: no gross musculoskeletal defects noted, no edema.            Signed Electronically by: Gurjit Dalton MD    "

## 2024-07-23 RX ORDER — ACETAMINOPHEN 325 MG/1
TABLET ORAL
Qty: 100 TABLET | OUTPATIENT
Start: 2024-07-23

## 2024-07-23 RX ORDER — MEDICAL SUPPLY, MISCELLANEOUS
EACH MISCELLANEOUS
Qty: 25 TABLET | OUTPATIENT
Start: 2024-07-23

## 2024-07-23 RX ORDER — EPINEPHRINE 0.3 MG/.3ML
INJECTION SUBCUTANEOUS
OUTPATIENT
Start: 2024-07-23

## 2024-07-28 ENCOUNTER — APPOINTMENT (OUTPATIENT)
Dept: GENERAL RADIOLOGY | Facility: CLINIC | Age: 39
End: 2024-07-28
Attending: EMERGENCY MEDICINE
Payer: MEDICAID

## 2024-07-28 ENCOUNTER — HOSPITAL ENCOUNTER (EMERGENCY)
Facility: CLINIC | Age: 39
Discharge: NURSING, REHAB OR LONG-TERM CARE | End: 2024-07-29
Attending: EMERGENCY MEDICINE | Admitting: EMERGENCY MEDICINE
Payer: MEDICAID

## 2024-07-28 VITALS
TEMPERATURE: 98.3 F | DIASTOLIC BLOOD PRESSURE: 93 MMHG | HEIGHT: 67 IN | RESPIRATION RATE: 16 BRPM | BODY MASS INDEX: 29.91 KG/M2 | OXYGEN SATURATION: 98 % | HEART RATE: 111 BPM | SYSTOLIC BLOOD PRESSURE: 142 MMHG

## 2024-07-28 DIAGNOSIS — M25.571 RIGHT ANKLE PAIN, UNSPECIFIED CHRONICITY: ICD-10-CM

## 2024-07-28 PROCEDURE — 73610 X-RAY EXAM OF ANKLE: CPT | Mod: RT

## 2024-07-28 PROCEDURE — 99284 EMERGENCY DEPT VISIT MOD MDM: CPT

## 2024-07-28 ASSESSMENT — COLUMBIA-SUICIDE SEVERITY RATING SCALE - C-SSRS
1. IN THE PAST MONTH, HAVE YOU WISHED YOU WERE DEAD OR WISHED YOU COULD GO TO SLEEP AND NOT WAKE UP?: NO
2. HAVE YOU ACTUALLY HAD ANY THOUGHTS OF KILLING YOURSELF IN THE PAST MONTH?: NO
6. HAVE YOU EVER DONE ANYTHING, STARTED TO DO ANYTHING, OR PREPARED TO DO ANYTHING TO END YOUR LIFE?: NO

## 2024-07-28 ASSESSMENT — ACTIVITIES OF DAILY LIVING (ADL): ADLS_ACUITY_SCORE: 35

## 2024-07-29 LAB — HOLD SPECIMEN: NORMAL
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
## 2024-07-29 NOTE — DISCHARGE INSTRUCTIONS
Your x-ray tonight demonstrates the old fracture, but there is no clear signs of a new fracture.  I have offered and recommended a boot and crutches which you have declined at this time.  I do recommend you follow-up with your orthopedic doctor for continued management.
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
## 2024-07-30 ENCOUNTER — VIRTUAL VISIT (OUTPATIENT)
Dept: FAMILY MEDICINE | Facility: CLINIC | Age: 39
End: 2024-07-30
Payer: MEDICAID

## 2024-07-30 DIAGNOSIS — S82.891S CLOSED RIGHT ANKLE FRACTURE, SEQUELA: ICD-10-CM

## 2024-07-30 DIAGNOSIS — R26.9 GAIT ABNORMALITY: ICD-10-CM

## 2024-07-30 DIAGNOSIS — E66.811 CLASS 1 OBESITY DUE TO EXCESS CALORIES WITH SERIOUS COMORBIDITY AND BODY MASS INDEX (BMI) OF 31.0 TO 31.9 IN ADULT: ICD-10-CM

## 2024-07-30 DIAGNOSIS — E66.09 CLASS 1 OBESITY DUE TO EXCESS CALORIES WITH SERIOUS COMORBIDITY AND BODY MASS INDEX (BMI) OF 31.0 TO 31.9 IN ADULT: ICD-10-CM

## 2024-07-30 DIAGNOSIS — F25.0 SCHIZOAFFECTIVE DISORDER, BIPOLAR TYPE (H): ICD-10-CM

## 2024-07-30 DIAGNOSIS — G47.00 INSOMNIA, UNSPECIFIED TYPE: Primary | ICD-10-CM

## 2024-07-30 PROCEDURE — 99442 PR PHYSICIAN TELEPHONE EVALUATION 11-20 MIN: CPT | Mod: 93 | Performed by: INTERNAL MEDICINE

## 2024-07-30 RX ORDER — LANOLIN ALCOHOL/MO/W.PET/CERES
6 CREAM (GRAM) TOPICAL
Qty: 90 TABLET | Refills: 3 | Status: SHIPPED | OUTPATIENT
Start: 2024-07-30

## 2024-07-30 RX ORDER — QUETIAPINE FUMARATE 50 MG/1
200 TABLET, FILM COATED ORAL 2 TIMES DAILY
Qty: 30 TABLET | Refills: 0 | Status: CANCELLED | OUTPATIENT
Start: 2024-07-30

## 2024-07-30 RX ORDER — METFORMIN HCL 500 MG
500 TABLET, EXTENDED RELEASE 24 HR ORAL 2 TIMES DAILY WITH MEALS
Qty: 60 TABLET | Refills: 0 | Status: CANCELLED | OUTPATIENT
Start: 2024-07-30
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
## 2024-08-01 ENCOUNTER — TELEPHONE (OUTPATIENT)
Dept: FAMILY MEDICINE | Facility: CLINIC | Age: 39
End: 2024-08-01
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
## 2024-08-12 ENCOUNTER — TELEPHONE (OUTPATIENT)
Dept: FAMILY MEDICINE | Facility: CLINIC | Age: 39
End: 2024-08-12
Payer: MEDICAID

## 2024-09-09 ENCOUNTER — TELEPHONE (OUTPATIENT)
Dept: OCCUPATIONAL THERAPY | Facility: CLINIC | Age: 39
End: 2024-09-09

## 2024-09-09 ENCOUNTER — HOSPITAL ENCOUNTER (EMERGENCY)
Facility: CLINIC | Age: 39
Discharge: HOME | End: 2024-09-09
Attending: EMERGENCY MEDICINE | Admitting: EMERGENCY MEDICINE
Payer: MEDICAID

## 2024-09-09 ENCOUNTER — TELEPHONE (OUTPATIENT)
Dept: NURSING | Facility: CLINIC | Age: 39
End: 2024-09-09

## 2024-09-09 VITALS
DIASTOLIC BLOOD PRESSURE: 73 MMHG | WEIGHT: 174 LBS | HEART RATE: 102 BPM | TEMPERATURE: 98.5 F | BODY MASS INDEX: 27.31 KG/M2 | OXYGEN SATURATION: 100 % | RESPIRATION RATE: 16 BRPM | SYSTOLIC BLOOD PRESSURE: 128 MMHG | HEIGHT: 67 IN

## 2024-09-09 DIAGNOSIS — R30.0 DYSURIA: ICD-10-CM

## 2024-09-09 LAB
ALBUMIN UR-MCNC: 30 MG/DL
APPEARANCE UR: CLEAR
BILIRUB UR QL STRIP: NEGATIVE
COLOR UR AUTO: YELLOW
GLUCOSE UR STRIP-MCNC: NEGATIVE MG/DL
HGB UR QL STRIP: ABNORMAL
KETONES UR STRIP-MCNC: NEGATIVE MG/DL
LEUKOCYTE ESTERASE UR QL STRIP: ABNORMAL
MUCOUS THREADS #/AREA URNS LPF: PRESENT /LPF
NITRATE UR QL: NEGATIVE
PH UR STRIP: 5.5 [PH] (ref 5–7)
RBC URINE: 1 /HPF
SP GR UR STRIP: 1.03 (ref 1–1.03)
SQUAMOUS EPITHELIAL: 1 /HPF
UROBILINOGEN UR STRIP-MCNC: NORMAL MG/DL
WBC URINE: 5 /HPF

## 2024-09-09 PROCEDURE — 250N000011 HC RX IP 250 OP 636: Performed by: EMERGENCY MEDICINE

## 2024-09-09 PROCEDURE — 81001 URINALYSIS AUTO W/SCOPE: CPT | Performed by: EMERGENCY MEDICINE

## 2024-09-09 PROCEDURE — 96372 THER/PROPH/DIAG INJ SC/IM: CPT | Performed by: EMERGENCY MEDICINE

## 2024-09-09 PROCEDURE — 99284 EMERGENCY DEPT VISIT MOD MDM: CPT | Mod: 25

## 2024-09-09 RX ORDER — CEFTRIAXONE SODIUM 1 G
500 VIAL (EA) INJECTION ONCE
Status: COMPLETED | OUTPATIENT
Start: 2024-09-09 | End: 2024-09-09

## 2024-09-09 RX ORDER — DOXYCYCLINE 100 MG/1
100 CAPSULE ORAL 2 TIMES DAILY
Qty: 14 CAPSULE | Refills: 0 | Status: SHIPPED | OUTPATIENT
Start: 2024-09-09 | End: 2024-09-16

## 2024-09-09 RX ADMIN — CEFTRIAXONE SODIUM 500 MG: 1 INJECTION, POWDER, FOR SOLUTION INTRAMUSCULAR; INTRAVENOUS at 11:47

## 2024-09-09 ASSESSMENT — ACTIVITIES OF DAILY LIVING (ADL): ADLS_ACUITY_SCORE: 35
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
## 2024-09-09 NOTE — TELEPHONE ENCOUNTER
[facility]    Reason for call: Lab Result Notification     Lab Result (including Rx patient on, if applicable).  If culture, copy of lab report at bottom.  Lab Result: N Gonorhea and Chlamydia T      Patient's current Symptoms:   NA    RN Recommendations/Instructions per [facility] ED lab result protocol:   [facility] ED lab result protocol utilized: [facility]  Notified that labs have been cancelled  Encouraged to contact PCP to discuss retesting for chlamydia  Encouraged to take the Doxycycline.  He asked if [facility] was going to deliver his medication.  Gave phone number         Jose Barnett RN
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
## 2024-09-09 NOTE — DISCHARGE INSTRUCTIONS
We'll treat you for possible gonorrhea/chlamydia today given your symptoms.     If the results return positive, we'll call you.     I placed a referral for you to establish primary care here in town. Plan on following up in about 1-2 weeks.     Return to emergency department for fever > 100.4, worsening pain, inability to urinate, or for any other concerns.

## 2024-09-10 ENCOUNTER — TELEPHONE (OUTPATIENT)
Dept: FAMILY MEDICINE | Facility: CLINIC | Age: 39
End: 2024-09-10
Payer: MEDICAID

## 2024-09-10 NOTE — TELEPHONE ENCOUNTER
Patient needs face to face visit with his provider for electric wheel chair DME request  I see that he has visit scheduled this month.  If he needs it sooner, help  schedule for a video visit next Tuesday with Dr. Dalton
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
## 2024-09-10 NOTE — TELEPHONE ENCOUNTER
"Called patient and relay provider's message below. Patient agreed to visit and scheduled for 9/17/24.     During call patient reports he is confused why he is needing additional visit. States Dr. Hardy with [facility] who performed his surgery already approved a prescription for electric wheelchair. States he received a message by [facility] that they are in the process of working on electric wheelchair. Ask if patient he has follow up with [facility] and patient denied. Patient states he does not think he should be the \"middle person\" to handle this and wants [facility] staff to follow up and take care of this.     Patient states he talked to a \"[facility] nurse\" yesterday and reported same information. States the nurse took down [facility] number and said they were going to call. Writer did not see any telephone encounter for yesterday regarding this. Advise patient would need to follow up with Dr. Swartz's team to have them follow up as they are the approving provider for wheelchair otherwise if a prescription is needed from Dr. Dalton a visit is needed.     Patient also voiced he wants an electric wheelchair that is going to fit him. States his insurance has a \"budget\" for wheelchair and wants to make sure he is able to choose wheelchair that is going to fit his needs and not just a basic wheelchair if insurance allows him to get a better wheelchair.     Advise he can discuss these concerns with provider at visit but informed [facility] does not have access to options of wheelchair he can choose and will need to follow up [facility] directly about options. Patient requesting writer call [facility] at [phone] to follow up.     Advise can call [facility] to check on status but again advise if prescription is needed from PCP, visit will be required and to keep scheduled appointment. Explained PCP cannot approve prescription for another provider has approved.  Patient verbalized understanding.     Writer attempted to call " [phone] and invalid number reached.     JESU Black  [facility]

## 2024-09-13 ENCOUNTER — TELEPHONE (OUTPATIENT)
Dept: OCCUPATIONAL THERAPY | Facility: CLINIC | Age: 39
End: 2024-09-13
Payer: MEDICAID

## 2024-09-18 ENCOUNTER — HOSPITAL ENCOUNTER (EMERGENCY)
Facility: CLINIC | Age: 39
Discharge: HOME | End: 2024-09-18
Attending: EMERGENCY MEDICINE | Admitting: EMERGENCY MEDICINE
Payer: MEDICAID

## 2024-09-18 VITALS
OXYGEN SATURATION: 100 % | WEIGHT: 175 LBS | TEMPERATURE: 98.5 F | RESPIRATION RATE: 18 BRPM | SYSTOLIC BLOOD PRESSURE: 135 MMHG | DIASTOLIC BLOOD PRESSURE: 92 MMHG | BODY MASS INDEX: 27.41 KG/M2 | HEART RATE: 75 BPM

## 2024-09-18 DIAGNOSIS — R30.0 DYSURIA: ICD-10-CM

## 2024-09-18 DIAGNOSIS — H04.123 DRY EYES: ICD-10-CM

## 2024-09-18 LAB
ALBUMIN UR-MCNC: NEGATIVE MG/DL
APPEARANCE UR: CLEAR
BILIRUB UR QL STRIP: NEGATIVE
COLOR UR AUTO: ABNORMAL
GLUCOSE UR STRIP-MCNC: NEGATIVE MG/DL
HGB UR QL STRIP: ABNORMAL
KETONES UR STRIP-MCNC: NEGATIVE MG/DL
LEUKOCYTE ESTERASE UR QL STRIP: ABNORMAL
MUCOUS THREADS #/AREA URNS LPF: PRESENT /LPF
NITRATE UR QL: NEGATIVE
PH UR STRIP: 5.5 [PH] (ref 5–7)
RBC URINE: 1 /HPF
SP GR UR STRIP: 1.01 (ref 1–1.03)
SQUAMOUS EPITHELIAL: <1 /HPF
UROBILINOGEN UR STRIP-MCNC: NORMAL MG/DL
WBC URINE: 5 /HPF

## 2024-09-18 PROCEDURE — 99284 EMERGENCY DEPT VISIT MOD MDM: CPT

## 2024-09-18 PROCEDURE — 81001 URINALYSIS AUTO W/SCOPE: CPT | Performed by: EMERGENCY MEDICINE

## 2024-09-18 RX ORDER — DOXYCYCLINE 100 MG/1
100 CAPSULE ORAL 2 TIMES DAILY
Qty: 14 CAPSULE | Refills: 0 | Status: SHIPPED | OUTPATIENT
Start: 2024-09-18 | End: 2024-09-25

## 2024-09-18 RX ORDER — CARBOXYMETHYLCELLULOSE SODIUM 5 MG/ML
1 SOLUTION/ DROPS OPHTHALMIC 3 TIMES DAILY PRN
Qty: 15 ML | Refills: 0 | Status: SHIPPED | OUTPATIENT
Start: 2024-09-18 | End: 2024-09-22

## 2024-09-18 ASSESSMENT — ACTIVITIES OF DAILY LIVING (ADL)
ADLS_ACUITY_SCORE: 35
ADLS_ACUITY_SCORE: 35

## 2024-09-18 ASSESSMENT — COLUMBIA-SUICIDE SEVERITY RATING SCALE - C-SSRS
1. IN THE PAST MONTH, HAVE YOU WISHED YOU WERE DEAD OR WISHED YOU COULD GO TO SLEEP AND NOT WAKE UP?: NO
6. HAVE YOU EVER DONE ANYTHING, STARTED TO DO ANYTHING, OR PREPARED TO DO ANYTHING TO END YOUR LIFE?: NO
2. HAVE YOU ACTUALLY HAD ANY THOUGHTS OF KILLING YOURSELF IN THE PAST MONTH?: NO

## 2024-09-18 NOTE — ED TRIAGE NOTES
Pt tested for STD and tested weeks ago, did not take 5 of the antibiotic doses because he lost his backpack where he was. Says his symptoms (pain after urination) is better but not all the way gone
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
## 2024-09-21 ENCOUNTER — HOSPITAL ENCOUNTER (EMERGENCY)
Facility: CLINIC | Age: 39
Discharge: HOME | End: 2024-09-21
Attending: EMERGENCY MEDICINE | Admitting: EMERGENCY MEDICINE
Payer: MEDICAID

## 2024-09-21 ENCOUNTER — APPOINTMENT (OUTPATIENT)
Dept: CT IMAGING | Facility: CLINIC | Age: 39
End: 2024-09-21
Attending: EMERGENCY MEDICINE
Payer: MEDICAID

## 2024-09-21 VITALS
WEIGHT: 170 LBS | DIASTOLIC BLOOD PRESSURE: 80 MMHG | TEMPERATURE: 97.6 F | BODY MASS INDEX: 26.68 KG/M2 | RESPIRATION RATE: 15 BRPM | SYSTOLIC BLOOD PRESSURE: 134 MMHG | HEIGHT: 67 IN | OXYGEN SATURATION: 99 % | HEART RATE: 80 BPM

## 2024-09-21 DIAGNOSIS — N30.01 ACUTE CYSTITIS WITH HEMATURIA: ICD-10-CM

## 2024-09-21 DIAGNOSIS — R31.9 HEMATURIA, UNSPECIFIED TYPE: ICD-10-CM

## 2024-09-21 DIAGNOSIS — K29.00 ACUTE GASTRITIS WITHOUT HEMORRHAGE, UNSPECIFIED GASTRITIS TYPE: ICD-10-CM

## 2024-09-21 DIAGNOSIS — R10.30 LOWER ABDOMINAL PAIN: ICD-10-CM

## 2024-09-21 LAB
ALBUMIN SERPL BCG-MCNC: 4.7 G/DL (ref 3.5–5.2)
ALBUMIN UR-MCNC: 50 MG/DL
ALP SERPL-CCNC: 155 U/L (ref 40–150)
ALT SERPL W P-5'-P-CCNC: 18 U/L (ref 0–70)
ANION GAP SERPL CALCULATED.3IONS-SCNC: 14 MMOL/L (ref 7–15)
APPEARANCE UR: CLEAR
AST SERPL W P-5'-P-CCNC: 16 U/L (ref 0–45)
BASOPHILS # BLD AUTO: 0 10E3/UL (ref 0–0.2)
BASOPHILS NFR BLD AUTO: 0 %
BILIRUB SERPL-MCNC: 0.4 MG/DL
BILIRUB UR QL STRIP: NEGATIVE
BUN SERPL-MCNC: 12.8 MG/DL (ref 6–20)
CALCIUM SERPL-MCNC: 10 MG/DL (ref 8.8–10.4)
CHLORIDE SERPL-SCNC: 104 MMOL/L (ref 98–107)
COLOR UR AUTO: YELLOW
CREAT SERPL-MCNC: 0.85 MG/DL (ref 0.67–1.17)
EGFRCR SERPLBLD CKD-EPI 2021: >90 ML/MIN/1.73M2
EOSINOPHIL # BLD AUTO: 0 10E3/UL (ref 0–0.7)
EOSINOPHIL NFR BLD AUTO: 0 %
ERYTHROCYTE [DISTWIDTH] IN BLOOD BY AUTOMATED COUNT: 13.4 % (ref 10–15)
GLUCOSE SERPL-MCNC: 107 MG/DL (ref 70–99)
GLUCOSE UR STRIP-MCNC: 30 MG/DL
HCO3 SERPL-SCNC: 23 MMOL/L (ref 22–29)
HCT VFR BLD AUTO: 45.2 % (ref 40–53)
HGB BLD-MCNC: 15.2 G/DL (ref 13.3–17.7)
HGB UR QL STRIP: ABNORMAL
IMM GRANULOCYTES # BLD: 0 10E3/UL
IMM GRANULOCYTES NFR BLD: 0 %
KETONES UR STRIP-MCNC: ABNORMAL MG/DL
LEUKOCYTE ESTERASE UR QL STRIP: NEGATIVE
LIPASE SERPL-CCNC: 17 U/L (ref 13–60)
LYMPHOCYTES # BLD AUTO: 1.1 10E3/UL (ref 0.8–5.3)
LYMPHOCYTES NFR BLD AUTO: 14 %
MCH RBC QN AUTO: 28.3 PG (ref 26.5–33)
MCHC RBC AUTO-ENTMCNC: 33.6 G/DL (ref 31.5–36.5)
MCV RBC AUTO: 84 FL (ref 78–100)
MONOCYTES # BLD AUTO: 0.5 10E3/UL (ref 0–1.3)
MONOCYTES NFR BLD AUTO: 7 %
MUCOUS THREADS #/AREA URNS LPF: PRESENT /LPF
NEUTROPHILS # BLD AUTO: 5.8 10E3/UL (ref 1.6–8.3)
NEUTROPHILS NFR BLD AUTO: 79 %
NITRATE UR QL: NEGATIVE
NRBC # BLD AUTO: 0 10E3/UL
NRBC BLD AUTO-RTO: 0 /100
PH UR STRIP: 5.5 [PH] (ref 5–7)
PLATELET # BLD AUTO: 351 10E3/UL (ref 150–450)
POTASSIUM SERPL-SCNC: 3.3 MMOL/L (ref 3.4–5.3)
PROT SERPL-MCNC: 8.1 G/DL (ref 6.4–8.3)
RBC # BLD AUTO: 5.37 10E6/UL (ref 4.4–5.9)
RBC URINE: 10 /HPF
SODIUM SERPL-SCNC: 141 MMOL/L (ref 135–145)
SP GR UR STRIP: 1.03 (ref 1–1.03)
SQUAMOUS EPITHELIAL: 1 /HPF
UROBILINOGEN UR STRIP-MCNC: 2 MG/DL
WBC # BLD AUTO: 7.3 10E3/UL (ref 4–11)
WBC URINE: 6 /HPF

## 2024-09-21 PROCEDURE — 81001 URINALYSIS AUTO W/SCOPE: CPT | Performed by: EMERGENCY MEDICINE

## 2024-09-21 PROCEDURE — 80053 COMPREHEN METABOLIC PANEL: CPT | Performed by: EMERGENCY MEDICINE

## 2024-09-21 PROCEDURE — 87086 URINE CULTURE/COLONY COUNT: CPT | Performed by: EMERGENCY MEDICINE

## 2024-09-21 PROCEDURE — 85025 COMPLETE CBC W/AUTO DIFF WBC: CPT | Performed by: EMERGENCY MEDICINE

## 2024-09-21 PROCEDURE — 96361 HYDRATE IV INFUSION ADD-ON: CPT

## 2024-09-21 PROCEDURE — 36415 COLL VENOUS BLD VENIPUNCTURE: CPT | Performed by: EMERGENCY MEDICINE

## 2024-09-21 PROCEDURE — 96375 TX/PRO/DX INJ NEW DRUG ADDON: CPT

## 2024-09-21 PROCEDURE — 258N000003 HC RX IP 258 OP 636: Performed by: EMERGENCY MEDICINE

## 2024-09-21 PROCEDURE — 99285 EMERGENCY DEPT VISIT HI MDM: CPT | Mod: 25

## 2024-09-21 PROCEDURE — 250N000013 HC RX MED GY IP 250 OP 250 PS 637: Performed by: EMERGENCY MEDICINE

## 2024-09-21 PROCEDURE — 83690 ASSAY OF LIPASE: CPT | Performed by: EMERGENCY MEDICINE

## 2024-09-21 PROCEDURE — 96374 THER/PROPH/DIAG INJ IV PUSH: CPT

## 2024-09-21 PROCEDURE — 74176 CT ABD & PELVIS W/O CONTRAST: CPT

## 2024-09-21 PROCEDURE — 250N000011 HC RX IP 250 OP 636: Performed by: EMERGENCY MEDICINE

## 2024-09-21 RX ORDER — ONDANSETRON 2 MG/ML
4 INJECTION INTRAMUSCULAR; INTRAVENOUS EVERY 30 MIN PRN
Status: DISCONTINUED | OUTPATIENT
Start: 2024-09-21 | End: 2024-09-21 | Stop reason: HOSPADM

## 2024-09-21 RX ORDER — CARBOXYMETHYLCELLULOSE SODIUM 5 MG/ML
1 SOLUTION/ DROPS OPHTHALMIC
Status: DISCONTINUED | OUTPATIENT
Start: 2024-09-21 | End: 2024-09-21 | Stop reason: HOSPADM

## 2024-09-21 RX ORDER — FAMOTIDINE 40 MG/1
40 TABLET, FILM COATED ORAL DAILY
Qty: 14 TABLET | Refills: 0 | Status: SHIPPED | OUTPATIENT
Start: 2024-09-21 | End: 2024-09-22

## 2024-09-21 RX ORDER — CEPHALEXIN 500 MG/1
500 CAPSULE ORAL 2 TIMES DAILY
Qty: 14 CAPSULE | Refills: 0 | Status: SHIPPED | OUTPATIENT
Start: 2024-09-21 | End: 2024-09-22

## 2024-09-21 RX ORDER — KETOROLAC TROMETHAMINE 15 MG/ML
15 INJECTION, SOLUTION INTRAMUSCULAR; INTRAVENOUS ONCE
Status: COMPLETED | OUTPATIENT
Start: 2024-09-21 | End: 2024-09-21

## 2024-09-21 RX ORDER — POTASSIUM CHLORIDE 1500 MG/1
40 TABLET, EXTENDED RELEASE ORAL ONCE
Status: COMPLETED | OUTPATIENT
Start: 2024-09-21 | End: 2024-09-21

## 2024-09-21 RX ORDER — FAMOTIDINE 20 MG/1
40 TABLET, FILM COATED ORAL ONCE
Status: COMPLETED | OUTPATIENT
Start: 2024-09-21 | End: 2024-09-21

## 2024-09-21 RX ORDER — CEPHALEXIN 500 MG/1
500 CAPSULE ORAL ONCE
Status: COMPLETED | OUTPATIENT
Start: 2024-09-21 | End: 2024-09-21

## 2024-09-21 RX ADMIN — ONDANSETRON 4 MG: 2 INJECTION INTRAMUSCULAR; INTRAVENOUS at 17:36

## 2024-09-21 RX ADMIN — CARBOXYMETHYLCELLULOSE SODIUM 1 DROP: 5 SOLUTION/ DROPS OPHTHALMIC at 19:19

## 2024-09-21 RX ADMIN — FAMOTIDINE 40 MG: 20 TABLET, FILM COATED ORAL at 20:46

## 2024-09-21 RX ADMIN — KETOROLAC TROMETHAMINE 15 MG: 15 INJECTION, SOLUTION INTRAMUSCULAR; INTRAVENOUS at 18:01

## 2024-09-21 RX ADMIN — CEPHALEXIN 500 MG: 500 CAPSULE ORAL at 20:46

## 2024-09-21 RX ADMIN — SODIUM CHLORIDE 1000 ML: 9 INJECTION, SOLUTION INTRAVENOUS at 17:36

## 2024-09-21 RX ADMIN — POTASSIUM CHLORIDE 40 MEQ: 1500 TABLET, EXTENDED RELEASE ORAL at 20:46

## 2024-09-21 ASSESSMENT — ACTIVITIES OF DAILY LIVING (ADL)
ADLS_ACUITY_SCORE: 35

## 2024-09-21 ASSESSMENT — COLUMBIA-SUICIDE SEVERITY RATING SCALE - C-SSRS
6. HAVE YOU EVER DONE ANYTHING, STARTED TO DO ANYTHING, OR PREPARED TO DO ANYTHING TO END YOUR LIFE?: NO
2. HAVE YOU ACTUALLY HAD ANY THOUGHTS OF KILLING YOURSELF IN THE PAST MONTH?: NO
1. IN THE PAST MONTH, HAVE YOU WISHED YOU WERE DEAD OR WISHED YOU COULD GO TO SLEEP AND NOT WAKE UP?: NO

## 2024-09-21 NOTE — ED TRIAGE NOTES
Pt reports recently being seen in the ED. Pt notes being given abx and states his stomach is worse. Pt reports not being able to hold anything down.      Triage Assessment (Adult)       Row Name 09/21/24 4732          Triage Assessment    Airway WDL WDL        Respiratory WDL    Respiratory WDL WDL        Skin Circulation/Temperature WDL    Skin Circulation/Temperature WDL WDL        Cardiac WDL    Cardiac WDL WDL        Peripheral/Neurovascular WDL    Peripheral Neurovascular WDL WDL        Cognitive/Neuro/Behavioral WDL    Cognitive/Neuro/Behavioral WDL WDL

## 2024-09-21 NOTE — ED PROVIDER NOTES
"  Emergency Department Note      History of Present Illness     Chief Complaint   Nausea and vomiting    HPI   Alok Aquino is a 38 year old male with history of hypertension who presents to the ED for evaluation of nausea and vomiting. The patient reports that he has been taking doxycycline for the past 2 days. He has taken 4 doses and with each dose he vomits about 20 minutes after. He has been getting hot and cold flashes since the start of his antibiotics as well. Patient endorses diarrhea as well as a burning sensation after he urinates. He has used doxycycline in the past and had no issues with it. Patient reports that he uses tobacco and marijuana. He denies abdominal pain, history of abdominal surgeries, or testicle pain. Patient denies hematemesis or rectal bleeding.     Independent Historian   None    Review of External Notes   I reviewed medical records from: [facility] acute [facility]/ED on 8/2/24     Past Medical History     Medical History and Problem List   Hypertension  ELIAZAR  Hypercholesterolemia  Schizophrenia  Polysubstance abuse  PTSD  Seizures  Insomnia  Nephrolithiasis  Bipolar 2 disorder    Medications   Benadryl  Doxycycline  Gabapentin  Metformin  Toprol  Remeron  Seroquel  Desyrel  Atarax  Cogentin  Lexapro  Risperdal  Naproxen    Surgical History   Ureteral stent placement    Physical Exam     Patient Vitals for the past 24 hrs:   BP Temp Temp src Pulse Resp SpO2 Height Weight   09/21/24 2000 134/80 -- -- 80 15 99 % -- --   09/21/24 1922 -- -- -- -- 13 99 % -- --   09/21/24 1921 135/88 -- -- 86 -- -- -- --   09/21/24 1635 129/77 97.6  F (36.4  C) Temporal 88 20 98 % 1.702 m (5' 7\") 77.1 kg (170 lb)     Physical Exam  Constitutional: Well developed, nontox appearance  Head: Atraumatic.   Mouth/Throat: Oropharynx is clear and moist.Neck:  no stridor  Eyes: no scleral icterus  Cardiovascular: RRR, 2+ bilat radial pulses  Pulmonary/Chest: nml resp effort  Abdominal: ND, soft, NT (unable to " reproduce patient's pain), no rebound or guarding   : no CVA tenderness bilat  Ext: Warm, well perfused, no edema  Neurological: A&O, symmetric facies, moves ext x4  Skin: Skin is warm and dry.   Psychiatric: Behavior is normal. Thought content normal.   Nursing note and vitals reviewed.    Diagnostics     Lab Results   Labs Ordered and Resulted from Time of ED Arrival to Time of ED Departure   COMPREHENSIVE METABOLIC PANEL - Abnormal       Result Value    Sodium 141      Potassium 3.3 (*)     Carbon Dioxide (CO2) 23      Anion Gap 14      Urea Nitrogen 12.8      Creatinine 0.85      GFR Estimate >90      Calcium 10.0      Chloride 104      Glucose 107 (*)     Alkaline Phosphatase 155 (*)     AST 16      ALT 18      Protein Total 8.1      Albumin 4.7      Bilirubin Total 0.4     ROUTINE UA WITH MICROSCOPIC REFLEX TO CULTURE - Abnormal    Color Urine Yellow      Appearance Urine Clear      Glucose Urine 30 (*)     Bilirubin Urine Negative      Ketones Urine Trace (*)     Specific Gravity Urine 1.031      Blood Urine Moderate (*)     pH Urine 5.5      Protein Albumin Urine 50 (*)     Urobilinogen Urine 2.0      Nitrite Urine Negative      Leukocyte Esterase Urine Negative      Mucus Urine Present (*)     RBC Urine 10 (*)     WBC Urine 6 (*)     Squamous Epithelials Urine 1     LIPASE - Normal    Lipase 17     CBC WITH PLATELETS AND DIFFERENTIAL    WBC Count 7.3      RBC Count 5.37      Hemoglobin 15.2      Hematocrit 45.2      MCV 84      MCH 28.3      MCHC 33.6      RDW 13.4      Platelet Count 351      % Neutrophils 79      % Lymphocytes 14      % Monocytes 7      % Eosinophils 0      % Basophils 0      % Immature Granulocytes 0      NRBCs per 100 WBC 0      Absolute Neutrophils 5.8      Absolute Lymphocytes 1.1      Absolute Monocytes 0.5      Absolute Eosinophils 0.0      Absolute Basophils 0.0      Absolute Immature Granulocytes 0.0      Absolute NRBCs 0.0     URINE CULTURE       Imaging   CT Abdomen Pelvis w/o  Contrast   Final Result   IMPRESSION:    1.  No urinary calculi or hydronephrosis.      2.  Normal appendix.      3.  Previous granulomatous infection.             EKG   None    Independent Interpretation   None    ED Course      Medications Administered   Medications   ondansetron (ZOFRAN) injection 4 mg (4 mg Intravenous $Given 9/21/24 1736)   carboxymethylcellulose PF (REFRESH PLUS) 0.5 % ophthalmic solution 1 drop (1 drop Both Eyes $Given 9/21/24 1919)   cephALEXin (KEFLEX) capsule 500 mg (has no administration in time range)   famotidine (PEPCID) tablet 40 mg (has no administration in time range)   potassium chloride trung ER (KLOR-CON M20) CR tablet 40 mEq (has no administration in time range)   sodium chloride 0.9% BOLUS 1,000 mL (0 mLs Intravenous Stopped 9/21/24 1920)   ketorolac (TORADOL) injection 15 mg (15 mg Intravenous $Given 9/21/24 1801)       Procedures   None     Discussion of Management   None    ED Course   ED Course as of 09/21/24 2040   Sat Sep 21, 2024   1724 I obtained history and examined the patient as noted above.    2032 I rechecked and updated the patient.        Additional Documentation  Homelessness, tobacco use increasing risk for complication     Medical Decision Making / Diagnosis     CMS Diagnoses: None    MIPS   None    [facility]   Alok Aquino is a 38 year old male presenting w/ abdominal pain, vomiting     Differential diagnosis includes medication adverse reaction, gastritis, peptic ulcer disease, hepatitis, pancreatitis, UTI.  Less likely biliary pathology.  Abdominal exam is reassuring.  Labs significant for urinalysis with small amount of hematuria, no obvious for infection.  Imaging sig for no remarkable acute abnormality.  Patient may be having abdominal pain secondary to his reinitiation of antibiotics with associated gastritis.  He was counseled on taking medications with food and prescribed an antiacid as noted below.  Given otherwise reassuring workup with a benign  abdominal exam, feel the patient is safe for discharge. Recommendations given regarding follow up with PCP and return to the emergency department as needed for new or worsening symptoms.  Pt counseled on all results, disposition and diagnosis.  They are understanding and agreeable to plan. Patient discharged in stable condition.    Disposition   The patient was discharged.     Diagnosis     ICD-10-CM    1. Hematuria, unspecified type  R31.9       2. Lower abdominal pain  R10.30       3. Acute cystitis with hematuria  N30.01            Discharge Medications   New Prescriptions    CEPHALEXIN (KEFLEX) 500 MG CAPSULE    Take 1 capsule (500 mg) by mouth 2 times daily for 7 days.    FAMOTIDINE (PEPCID) 40 MG TABLET    Take 1 tablet (40 mg) by mouth daily for 14 days.       Scribe Disclosure:  I, Gianluca Green, am serving as a scribe at 5:24 PM on 9/21/2024 to document services personally performed by Branden Dykes MD based on my observations and the provider's statements to me.        Branden Dykes MD  09/22/24 0046

## 2024-09-22 ENCOUNTER — TELEPHONE (OUTPATIENT)
Dept: NURSING | Facility: CLINIC | Age: 39
End: 2024-09-22
Payer: MEDICAID

## 2024-09-22 RX ORDER — CARBOXYMETHYLCELLULOSE SODIUM 5 MG/ML
1 SOLUTION/ DROPS OPHTHALMIC 3 TIMES DAILY PRN
Qty: 15 ML | Refills: 0 | Status: SHIPPED | OUTPATIENT
Start: 2024-09-22

## 2024-09-22 RX ORDER — CEPHALEXIN 500 MG/1
500 CAPSULE ORAL 2 TIMES DAILY
Qty: 14 CAPSULE | Refills: 0 | Status: SHIPPED | OUTPATIENT
Start: 2024-09-22

## 2024-09-22 RX ORDER — FAMOTIDINE 40 MG/1
40 TABLET, FILM COATED ORAL DAILY
Qty: 14 TABLET | Refills: 0 | Status: SHIPPED | OUTPATIENT
Start: 2024-09-22

## 2024-09-22 NOTE — TELEPHONE ENCOUNTER
[facility]    Reason for call: requesting lab results and rx sent to his pharmacy as [place] is closed on weekends    Lab Result (including Rx patient on, if applicable).  If culture, copy of lab report at bottom.  Lab Result: urine culture in process  CBC and chem panel would have been discussed with you by your ED Provider    Creatinine Level (mg/dl)   Creatinine   Date Value Ref Range Status   09/21/2024 0.85 0.67 - 1.17 mg/dL Final   07/27/2020 1.08 0.66 - 1.25 mg/dL Final    Creatinine clearance (ml/min), if applicable    Serum creatinine: 0.85 mg/dL 09/21/24 1735  Estimated creatinine clearance: 126 mL/min     Patient's current Symptoms:   Eye is swollen  Not feeling like he is emptying his bladder    RN Recommendations/Instructions per [place] ED lab result protocol:   [facility] ED lab result protocol utilized: misc/RN protocol  Rx's from ED visit on 9/21 sent to [facility]    Patient/care giver notified to contact your PCP clinic or return to the Emergency department if your:  Symptoms worsen or other concerning symptoms.    Jose Barnett RN

## 2024-09-22 NOTE — DISCHARGE INSTRUCTIONS
Continue antibiotics as previously prescribed.    Please return to the emergency department as needed for new or worsening symptoms including severe uncontrollable pain, vomiting unable to keep anything down, fever greater than 100.4  F, any other concerning symptoms.    Discharge Instructions  Urinary Tract Infection  You or your child have been diagnosed with a urinary tract infection, or UTI. The urinary tract includes the kidneys (which make urine/pee), ureters (the tubes that carry urine/pee from the kidneys to the bladder), the bladder (which stores urine/pee), and urethra (the tube that carries urine/pee out of the bladder). Urinary tract infections occur when bacteria travel up the urethra into the bladder (bladder infection) and, in some cases, from there into the kidneys (kidney infection).  Generally, every Emergency Department visit should have a follow-up clinic visit with either a primary or a specialty clinic/provider. Please follow-up as instructed by your emergency provider today.  Return to the Emergency Department if:  You or your child have severe back pain.  You or your child are vomiting (throwing up) so that you cannot take your medicine.  You or your child have a new fever (had not previously had a fever) over 101 F.  You or your child have confusion or are very weak, or feel very ill.  Your child seems much more ill, will not wake up, will not respond right, or is crying for a long time and will not calm down.  You or your child are showing signs of dehydration. These signs may include decreased urination (pee), dry mouth/gums/tongue, or decreased activity.    Follow-up with your provider:   Children under 24 months need to be seen by their regular provider within one week after a diagnosis of a UTI. It may be necessary to do some more tests to look at the child s kidney or bladder.  You should begin to feel better within 24 - 48 hours of starting your antibiotic; follow-up with your regular  clinic/doctor/provider if this is not the case.    Treatment:   You will be treated with an antibiotic to kill the bacteria. We have to make an educated guess, based on what we know about common bacteria and antibiotics, as to which antibiotic will work for your infection. We will be correct most times but there will be some cases where the antibiotic chosen is not correct (see urine cultures below).  Take a pain medication such as acetaminophen (Tylenol ) or ibuprofen (Advil , Motrin , Nuprin ).  Phenazopyridine (Pyridium , Uristat ) is a prescription medication that numbs the bladder to reduce the burning pain of some UTIs.  The same medication is available in a non-prescription version (Azo-Standard , Urodol ). This medication will change the color of the urine and tears (usually blue or orange). If you wear contacts, do not wear them while taking this medication as they may be stained by the medication.    Urine Cultures:  If indicated, a urine culture may have been performed today. This test generally takes 24-48 hours to complete so the results are not known at this time. The results can confirm that an infection is present but also determine which antibiotic is effective for the specific bacteria that is causing the infection. If your urine culture shows that the antibiotic you were given today will not work to treat your infection, we will attempt to contact you to make arrangements to change the antibiotic. If the culture confirms that the antibiotic is effective for your infection, you will not be contacted. We often recommend follow-up with your regular physician/provider on the culture results regardless of this process.    Antibiotic Warning:   If you have been placed on antibiotics - watch for signs of allergic reaction.  These include rash, lip swelling, difficulty breathing, wheezing, and dizziness.  If you develop any of these symptoms, stop the antibiotic immediately and go to an emergency room or  "urgent care for evaluation.    Probiotics: If you have been given an antibiotic, you may want to also take a probiotic pill or eat yogurt with live cultures. Probiotics have \"good bacteria\" to help your intestines stay healthy. Studies have shown that probiotics help prevent diarrhea and other intestine problems (including C. diff infection) when you take antibiotics. You can buy these without a prescription in the pharmacy section of the store.   If you were given a prescription for medicine here today, be sure to read all of the information (including the package insert) that comes with your prescription.  This will include important information about the medicine, its side effects, and any warnings that you need to know about.  The pharmacist who fills the prescription can provide more information and answer questions you may have about the medicine.  If you have questions or concerns that the pharmacist cannot address, please call or return to the Emergency Department.   Remember that you can always come back to the Emergency Department if you are not able to see your regular provider in the amount of time listed above, if you get any new symptoms, or if there is anything that worries you.    "

## 2024-09-23 LAB — BACTERIA UR CULT: NO GROWTH

## 2024-10-08 ENCOUNTER — TRANSCRIBE ORDERS (OUTPATIENT)
Dept: OTHER | Age: 39
End: 2024-10-08

## 2024-10-08 DIAGNOSIS — S82.891B ANKLE FRACTURE, RIGHT, OPEN TYPE I OR II, INITIAL ENCOUNTER: Primary | ICD-10-CM

## 2024-11-05 ENCOUNTER — VIRTUAL VISIT (OUTPATIENT)
Dept: FAMILY MEDICINE | Facility: CLINIC | Age: 39
End: 2024-11-05
Payer: MEDICAID

## 2024-11-05 DIAGNOSIS — F25.0 SCHIZOAFFECTIVE DISORDER, BIPOLAR TYPE (H): ICD-10-CM

## 2024-11-05 DIAGNOSIS — F19.10 POLYSUBSTANCE ABUSE (H): ICD-10-CM

## 2024-11-05 DIAGNOSIS — Z76.89 ENCOUNTER FOR WHEELCHAIR ASSESSMENT: Primary | ICD-10-CM

## 2024-11-05 DIAGNOSIS — S82.891S CLOSED RIGHT ANKLE FRACTURE, SEQUELA: ICD-10-CM

## 2024-11-05 PROCEDURE — 99214 OFFICE O/P EST MOD 30 MIN: CPT | Mod: 95 | Performed by: INTERNAL MEDICINE
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
## 2024-11-07 DIAGNOSIS — I10 ESSENTIAL HYPERTENSION: ICD-10-CM

## 2024-11-07 RX ORDER — METOPROLOL SUCCINATE 25 MG/1
25 TABLET, EXTENDED RELEASE ORAL DAILY
Qty: 30 TABLET | Refills: 5 | Status: SHIPPED | OUTPATIENT
Start: 2024-11-07

## 2024-11-07 RX ORDER — FAMOTIDINE 40 MG/1
40 TABLET, FILM COATED ORAL DAILY
Qty: 14 TABLET | Refills: 0 | OUTPATIENT
Start: 2024-11-07

## 2024-11-07 NOTE — TELEPHONE ENCOUNTER
Reason for Call:  Medication or medication refill:    Do you use a [facility]?  Name of the pharmacy and phone number for the current request:  [facility] [address], [place] [address]     Name of the medication requested: famotidine (PEPCID) 40 MG tablet   metoprolol succinate ER (TOPROL XL) 25 MG 24 hr tablet       Other request: This is Pt's preferred pharmacy tried to pull up pharmacy does not come up.     Can we leave a detailed message on this number? YES    Phone number patient can be reached at: Cell number on file:    Telephone Information:   Mobile [phone]       Best Time:     Call taken on 11/7/2024 at 2:22 PM by Analilia Bryant

## 2024-11-19 ENCOUNTER — TELEPHONE (OUTPATIENT)
Dept: FAMILY MEDICINE | Facility: CLINIC | Age: 39
End: 2024-11-19
Payer: MEDICAID

## 2024-11-19 NOTE — TELEPHONE ENCOUNTER
General Call    Contacts       Contact Date/Time Type Contact Phone/Fax    11/19/2024 12:26 PM CST Phone (Incoming) poly richteri worker (Other) [phone]     Ok to leave a detailed voicemail          Reason for Call:  Call Back     What are your questions or concerns:  Wheelchair     Date of last appointment with provider: 11/5/24    Okay to leave a detailed message?: Yes at Other phone number:  [phone]

## 2024-11-19 NOTE — TELEPHONE ENCOUNTER
LVM with [employer] that pt can discuss this with provider tomorrow at visit. If he does have any questions to call us at [phone]

## 2024-12-20 ENCOUNTER — HOSPITAL ENCOUNTER (EMERGENCY)
Facility: CLINIC | Age: 39
Discharge: HOME | End: 2024-12-20
Attending: EMERGENCY MEDICINE | Admitting: EMERGENCY MEDICINE
Payer: MEDICAID

## 2024-12-20 VITALS
TEMPERATURE: 98.8 F | RESPIRATION RATE: 18 BRPM | SYSTOLIC BLOOD PRESSURE: 129 MMHG | DIASTOLIC BLOOD PRESSURE: 95 MMHG | OXYGEN SATURATION: 99 % | HEART RATE: 83 BPM

## 2024-12-20 DIAGNOSIS — R45.851 SUICIDAL THOUGHTS: ICD-10-CM

## 2024-12-20 PROCEDURE — 99283 EMERGENCY DEPT VISIT LOW MDM: CPT

## 2024-12-20 ASSESSMENT — ACTIVITIES OF DAILY LIVING (ADL)
ADLS_ACUITY_SCORE: 41

## 2024-12-20 NOTE — ED NOTES
ACT team called to give collateral [phone]; they state he was just recently discharged from [facility] and didn't feel he was at his baseline for discharge; he states he is on a commitment and they might revoke the previsional discharge; pt did have a medication change which includes injections and daily meds; care team states he has only refused one time of his dose of his oral meds; ACT states pt has been disorganized and paranoid and not trusting in the community around him. Pt was given a 60 day eviction notice in his current apartment living situation, however if he is compliment with his medication, he can stay in his apartment; Act staff states pt doesn't seem to understand everything that's going on

## 2024-12-20 NOTE — ED TRIAGE NOTES
BIBA; pt called with SI thoughts secondary to increasing stressors in his life (housing, court and family)      Triage Assessment (Adult)       Row Name 12/20/24 1600          Triage Assessment    Airway WDL WDL        Respiratory WDL    Respiratory WDL WDL        Skin Circulation/Temperature WDL    Skin Circulation/Temperature WDL WDL        Cardiac WDL    Cardiac WDL WDL        Peripheral/Neurovascular WDL    Peripheral Neurovascular WDL WDL        Cognitive/Neuro/Behavioral WDL    Cognitive/Neuro/Behavioral WDL WDL

## 2024-12-20 NOTE — ED NOTES
Bed: [facility]  Expected date:   Expected time:   Means of arrival:   Comments:  540  39 M suicidal  1521
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
## 2024-12-21 NOTE — ED PROVIDER NOTES
Patient signed out to me by my partner, Dr. Montilla.  In brief, the patient presented with suicidal thoughts.  Medically stable.  Plan at the time of signout was to follow-up on DEC recommendations if the patient was seen by them prior to transfer to [facility].    The patient saw DEC prior to [facility] transfer and DEC recommends discharge.  The patient reportedly has multiple outpatient resources at his disposal.  I briefly evaluated the patient and he is agreeable to the plan, confirms that he has multiple outpatient resources.  He was therefore discharged home.     Alfredito Moreira MD  12/20/24 [id number]

## 2024-12-22 ENCOUNTER — TELEPHONE (OUTPATIENT)
Dept: BEHAVIORAL HEALTH | Facility: CLINIC | Age: 39
End: 2024-12-22
Payer: MEDICAID

## 2025-02-06 ENCOUNTER — OFFICE VISIT (OUTPATIENT)
Dept: FAMILY MEDICINE | Facility: CLINIC | Age: 40
End: 2025-02-06
Payer: MEDICAID

## 2025-02-06 VITALS
BODY MASS INDEX: 27.94 KG/M2 | DIASTOLIC BLOOD PRESSURE: 78 MMHG | RESPIRATION RATE: 14 BRPM | WEIGHT: 178 LBS | OXYGEN SATURATION: 97 % | TEMPERATURE: 97.6 F | HEIGHT: 67 IN | SYSTOLIC BLOOD PRESSURE: 120 MMHG | HEART RATE: 114 BPM

## 2025-02-06 DIAGNOSIS — Z87.81 HISTORY OF FRACTURE OF RIGHT ANKLE: ICD-10-CM

## 2025-02-06 DIAGNOSIS — R21 RASH AND NONSPECIFIC SKIN ERUPTION: ICD-10-CM

## 2025-02-06 DIAGNOSIS — F20.0 PARANOID SCHIZOPHRENIA (H): Primary | ICD-10-CM

## 2025-02-06 DIAGNOSIS — F99 INSOMNIA DUE TO OTHER MENTAL DISORDER: ICD-10-CM

## 2025-02-06 DIAGNOSIS — F19.10 POLYSUBSTANCE ABUSE (H): ICD-10-CM

## 2025-02-06 DIAGNOSIS — E78.00 HYPERCHOLESTEROLEMIA: ICD-10-CM

## 2025-02-06 DIAGNOSIS — I10 ESSENTIAL HYPERTENSION: ICD-10-CM

## 2025-02-06 DIAGNOSIS — F51.05 INSOMNIA DUE TO OTHER MENTAL DISORDER: ICD-10-CM

## 2025-02-06 RX ORDER — ACETAMINOPHEN 325 MG/1
325-650 TABLET ORAL EVERY 6 HOURS PRN
COMMUNITY
Start: 2025-02-06

## 2025-02-06 RX ORDER — OLANZAPINE 15 MG/1
15 TABLET ORAL AT BEDTIME
COMMUNITY
Start: 2025-02-06

## 2025-02-06 RX ORDER — CHOLECALCIFEROL (VITAMIN D3) 50 MCG
1 TABLET ORAL DAILY
COMMUNITY
Start: 2025-02-06

## 2025-02-06 RX ORDER — TRAZODONE HYDROCHLORIDE 50 MG/1
100-200 TABLET ORAL
COMMUNITY
Start: 2025-02-06

## 2025-02-06 RX ORDER — IBUPROFEN 600 MG/1
600 TABLET, FILM COATED ORAL EVERY 8 HOURS PRN
COMMUNITY
Start: 2025-02-06

## 2025-02-06 RX ORDER — QUETIAPINE FUMARATE 200 MG/1
200 TABLET, FILM COATED ORAL AT BEDTIME
COMMUNITY
Start: 2025-02-06

## 2025-02-06 ASSESSMENT — PAIN SCALES - GENERAL: PAINLEVEL_OUTOF10: NO PAIN (0)

## 2025-02-06 NOTE — PROGRESS NOTES
"  Assessment & Plan     1.  Paranoid schizophrenia.  Currently stable.  In care of psychiatrist.  He is refusing to take Seroquel.  Group home personnel will contact his psychiatrist regarding that.  2.  Rash and nonspecific skin eruption face and back.  I will refer to dermatologist.  3.  Hypertension.  Today is normotensive.  He was on low-dose metoprolol which she has not been taking so I discontinued.  He seems to be normotensive without medication at this point.  4.  History of hypercholesterolemia.  5.  Insomnia for which she is taking trazodone.  6.  Polysubstance abuse in the past.  None currently.  Patient in group home setting.  7.  History of fracture of the right ankle.  He has pain off-and-on particular with weightbearing.  Uses ibuprofen 600 mg daily as needed along with Tylenol.    Medications reviewed and compared with the group home records.  Paperwork signed.    BMI  Estimated body mass index is 27.88 kg/m  as calculated from the following:    Height as of this encounter: 1.702 m (5' 7\").    Weight as of this encounter: 80.7 kg (178 lb).         Margaux Dickinson is a 39 year old, presenting for the following health issues:  Recheck Medication (review)      2/6/2025     4:46 PM   Additional Questions   Roomed by Ayesha   Accompanied by RN         2/6/2025   Forms   Any forms needing to be completed Yes         2/6/2025     4:46 PM   Patient Reported Additional Medications   Patient reports taking the following new medications no     History of Present Illness       Reason for visit:  Complete form medication review   He is taking medications regularly.       Medication Follow up  Taking Medication as prescribed: yes  Side Effects:  None  Medication Helping Symptoms:  yes    Patient came in from group home for follow-up.  He is accompanied by a staff.  He states that he is doing okay except that he has chronic pain of the right ankle which he fractured previously.  He declines to take metoprolol " "and also Seroquel.    Review of Systems  Constitutional, HEENT, cardiovascular, pulmonary, GI, , musculoskeletal, neuro, skin, endocrine and psych systems are negative, except as otherwise noted.      Objective    /78 (BP Location: Right arm, Patient Position: Sitting, Cuff Size: Adult Regular)   Pulse 114   Temp 97.6  F (36.4  C) (Temporal)   Resp 14   Ht 1.702 m (5' 7\")   Wt 80.7 kg (178 lb)   SpO2 97%   BMI 27.88 kg/m    Body mass index is 27.88 kg/m .  Physical Exam   GENERAL: alert and no distress  HENT: ear canals and TM's normal, nose and mouth without ulcers or lesions  NECK: no adenopathy, no asymmetry, masses, or scars  RESP: lungs clear to auscultation - no rales, rhonchi or wheezes  CV: regular rate and rhythm, normal S1 S2, no S3 or S4, no murmur, click or rub, no peripheral edema  ABDOMEN: soft, nontender, no hepatosplenomegaly, no masses and bowel sounds normal  MS: no gross musculoskeletal defects noted, no edema  SKIN: Patient has small papular eruptions on the face and back.  Could be acne.  I will refer to dermatology.  NEURO: Normal strength and tone, mentation intact and speech normal            Signed Electronically by: Gurjit Dalton MD    "

## 2025-02-26 ENCOUNTER — TELEPHONE (OUTPATIENT)
Dept: FAMILY MEDICINE | Facility: CLINIC | Age: 40
End: 2025-02-26
Payer: MEDICAID

## 2025-02-26 NOTE — TELEPHONE ENCOUNTER
Patient Returning Call    Reason for call:   stopped by stating they did not realize during appt on 2/6/25 that Dr Dalton accidentally dated standing orders for year 2024. Please sign new standing orders with correct date and call [facility] to  order     Information relayed to patient:  will call once order is complete/updated    Patient has additional questions:  No      Okay to leave a detailed message?: No at Other phone number:  [phone]

## 2025-02-28 NOTE — TELEPHONE ENCOUNTER
Form placed in Dr. Jackson in basket. Please sign new order form with updated date.  Trish Aquino, VARUN

## 2025-04-15 ENCOUNTER — ALLIED HEALTH/NURSE VISIT (OUTPATIENT)
Dept: FAMILY MEDICINE | Facility: CLINIC | Age: 40
End: 2025-04-15
Payer: MEDICAID

## 2025-04-15 DIAGNOSIS — Z23 ENCOUNTER FOR IMMUNIZATION: ICD-10-CM

## 2025-04-15 DIAGNOSIS — Z23 NEED FOR MMR VACCINE: Primary | ICD-10-CM

## 2025-04-15 PROCEDURE — 90707 MMR VACCINE SC: CPT

## 2025-04-15 PROCEDURE — 90471 IMMUNIZATION ADMIN: CPT

## 2025-04-15 PROCEDURE — 99207 PR NO CHARGE NURSE ONLY: CPT

## 2025-04-15 NOTE — PROGRESS NOTES
Prior to immunization administration, verified patients identity using patient s name and date of birth. Please see Immunization Activity for additional information.     Is the patient's temperature normal (100.5 or less)? Yes     Patient MEETS CRITERIA. PROCEED with vaccine administration.      Patient instructed to remain in clinic for 15 minutes afterwards, and to report any adverse reactions.      Link to Ancillary Visit Immunization Standing Orders SmartSet     Screening performed by Ayesha Diallo MA on 4/15/2025 at 1:32 PM.

## 2025-06-20 ENCOUNTER — HOSPITAL ENCOUNTER (EMERGENCY)
Facility: CLINIC | Age: 40
Discharge: HOME | End: 2025-06-20
Admitting: EMERGENCY MEDICINE
Payer: MEDICAID

## 2025-06-20 VITALS
DIASTOLIC BLOOD PRESSURE: 91 MMHG | HEIGHT: 68 IN | TEMPERATURE: 98 F | BODY MASS INDEX: 29.73 KG/M2 | OXYGEN SATURATION: 99 % | SYSTOLIC BLOOD PRESSURE: 129 MMHG | RESPIRATION RATE: 16 BRPM | WEIGHT: 196.2 LBS | HEART RATE: 69 BPM

## 2025-06-20 DIAGNOSIS — Z11.3 ENCOUNTER FOR SCREENING EXAMINATION FOR SEXUALLY TRANSMITTED INFECTION: ICD-10-CM

## 2025-06-20 LAB
ALBUMIN UR-MCNC: NEGATIVE MG/DL
APPEARANCE UR: CLEAR
BILIRUB UR QL STRIP: NEGATIVE
COLOR UR AUTO: YELLOW
GLUCOSE UR STRIP-MCNC: NEGATIVE MG/DL
HGB UR QL STRIP: ABNORMAL
KETONES UR STRIP-MCNC: NEGATIVE MG/DL
LEUKOCYTE ESTERASE UR QL STRIP: NEGATIVE
MUCOUS THREADS #/AREA URNS LPF: PRESENT /LPF
NITRATE UR QL: NEGATIVE
PH UR STRIP: 6 [PH] (ref 5–7)
RBC URINE: 7 /HPF
SP GR UR STRIP: 1.03 (ref 1–1.03)
UROBILINOGEN UR STRIP-MCNC: NORMAL MG/DL
WBC URINE: 1 /HPF

## 2025-06-20 PROCEDURE — 81001 URINALYSIS AUTO W/SCOPE: CPT | Performed by: PHYSICIAN ASSISTANT

## 2025-06-20 PROCEDURE — 99283 EMERGENCY DEPT VISIT LOW MDM: CPT | Performed by: PHYSICIAN ASSISTANT

## 2025-06-20 PROCEDURE — 36415 COLL VENOUS BLD VENIPUNCTURE: CPT | Performed by: PHYSICIAN ASSISTANT

## 2025-06-20 PROCEDURE — 250N000009 HC RX 250: Performed by: PHYSICIAN ASSISTANT

## 2025-06-20 PROCEDURE — 250N000011 HC RX IP 250 OP 636: Performed by: PHYSICIAN ASSISTANT

## 2025-06-20 PROCEDURE — 87491 CHLMYD TRACH DNA AMP PROBE: CPT | Performed by: PHYSICIAN ASSISTANT

## 2025-06-20 PROCEDURE — 99284 EMERGENCY DEPT VISIT MOD MDM: CPT | Performed by: PHYSICIAN ASSISTANT

## 2025-06-20 PROCEDURE — 87591 N.GONORRHOEAE DNA AMP PROB: CPT | Performed by: PHYSICIAN ASSISTANT

## 2025-06-20 PROCEDURE — 96372 THER/PROPH/DIAG INJ SC/IM: CPT | Performed by: PHYSICIAN ASSISTANT

## 2025-06-20 RX ORDER — DOXYCYCLINE 100 MG/1
100 CAPSULE ORAL 2 TIMES DAILY
Qty: 14 CAPSULE | Refills: 0 | Status: SHIPPED | OUTPATIENT
Start: 2025-06-20

## 2025-06-20 RX ADMIN — LIDOCAINE HYDROCHLORIDE 500 MG: 10 INJECTION, SOLUTION EPIDURAL; INFILTRATION; INTRACAUDAL; PERINEURAL at 13:53

## 2025-06-20 ASSESSMENT — ACTIVITIES OF DAILY LIVING (ADL): ADLS_ACUITY_SCORE: 41

## 2025-06-20 ASSESSMENT — COLUMBIA-SUICIDE SEVERITY RATING SCALE - C-SSRS
2. HAVE YOU ACTUALLY HAD ANY THOUGHTS OF KILLING YOURSELF IN THE PAST MONTH?: NO
6. HAVE YOU EVER DONE ANYTHING, STARTED TO DO ANYTHING, OR PREPARED TO DO ANYTHING TO END YOUR LIFE?: YES
1. IN THE PAST MONTH, HAVE YOU WISHED YOU WERE DEAD OR WISHED YOU COULD GO TO SLEEP AND NOT WAKE UP?: NO
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
## 2025-06-20 NOTE — DISCHARGE INSTRUCTIONS
You should abstain from sexual contact until you complete your antibiotics.  The results of your test will be available in your MyChart.  Consider the [facility] ([email or web address]) for future sexual health needs.

## 2025-06-21 ENCOUNTER — TELEPHONE (OUTPATIENT)
Dept: NURSING | Facility: CLINIC | Age: 40
End: 2025-06-21
Payer: MEDICAID

## 2025-06-21 LAB
C TRACH DNA SPEC QL PROBE+SIG AMP: POSITIVE
N GONORRHOEA DNA SPEC QL NAA+PROBE: NEGATIVE
N GONORRHOEA DNA SPEC QL NAA+PROBE: NEGATIVE
SPECIMEN TYPE: ABNORMAL
SPECIMEN TYPE: NORMAL

## 2025-06-21 NOTE — TELEPHONE ENCOUNTER
[facility] ([place])    Reason for call: Lab Result Notification     Lab Result (including Rx patient on, if applicable).  If culture, copy of lab report at bottom.  Lab Result:   Component      Latest Ref Rng 6/20/2025  1:17 PM   Chlamydia Trachomatis PCR      Negative  Positive !    Neisseria gonorrhoeae      Negative  Negative    CTNG Specimen Source Urine, Voided       Legend:  ! Abnormal    ED Rx: doxycycline monohydrate (MONODOX) 100 MG capsule - Take 1 capsule (100 mg) by mouth 2 times daily     cefTRIAXone (ROCEPHIN) 500 mg in lidocaine injection - Once    Creatinine Level (mg/dl)   Creatinine   Date Value Ref Range Status   09/21/2024 0.85 0.67 - 1.17 mg/dL Final   07/27/2020 1.08 0.66 - 1.25 mg/dL Final    Creatinine clearance (ml/min), if applicable    Serum creatinine: 0.85 mg/dL 09/21/24 1735  Estimated creatinine clearance: 122.9 mL/min     ED Symptoms: Presented to the ED with intermittent dysuria and STD exposure.     Current Symptoms: Unable to assess.     RN Recommendations/Instructions per [place] ED lab result protocol:   [facility] ED lab result protocol utilized: Chlamydia    Unable to reach patient/caregiver.     Left voicemail message requesting a call back to [phone] between 9 a.m. and 5:30 p.m. for patient's ED/ lab results.    Letter pended to be sent via [employer] mail.     ABHI PENA RN

## 2025-06-21 NOTE — LETTER
June 21, 2025        Alok Aquino  [address] [place] [address]          Dear Alok Aquino:    You were seen in the [facility] Emergency Department at [facility] ([place] on 6/20/2025.  We are unable to reach you by phone, so we are sending you this letter.     It is important that you call [facility] Emergency Department lab result nurse at [phone], as we have information to relay to you AND/OR we MAY have to make some changes in your treatment.    Best time to call back is between 9AM and 5:30PM, 7 days a week.      Sincerely,     [facility] Emergency Department Lab Result RN  [phone]

## 2025-06-24 ENCOUNTER — OFFICE VISIT (OUTPATIENT)
Dept: URGENT CARE | Facility: URGENT CARE | Age: 40
End: 2025-06-24
Payer: MEDICAID

## 2025-06-24 ENCOUNTER — RESULTS FOLLOW-UP (OUTPATIENT)
Dept: URGENT CARE | Facility: URGENT CARE | Age: 40
End: 2025-06-24

## 2025-06-24 VITALS
SYSTOLIC BLOOD PRESSURE: 132 MMHG | TEMPERATURE: 98.5 F | OXYGEN SATURATION: 98 % | HEIGHT: 67 IN | BODY MASS INDEX: 30.29 KG/M2 | HEART RATE: 86 BPM | RESPIRATION RATE: 16 BRPM | DIASTOLIC BLOOD PRESSURE: 85 MMHG | WEIGHT: 193 LBS

## 2025-06-24 DIAGNOSIS — Z11.3 SCREENING FOR STD (SEXUALLY TRANSMITTED DISEASE): ICD-10-CM

## 2025-06-24 DIAGNOSIS — R10.32 ABDOMINAL PAIN, LEFT LOWER QUADRANT: Primary | ICD-10-CM

## 2025-06-24 LAB
ALBUMIN UR-MCNC: NEGATIVE MG/DL
APPEARANCE UR: CLEAR
BACTERIA #/AREA URNS HPF: ABNORMAL /HPF
BASOPHILS # BLD AUTO: 0 10E3/UL (ref 0–0.2)
BASOPHILS NFR BLD AUTO: 1 %
BILIRUB UR QL STRIP: NEGATIVE
COLOR UR AUTO: YELLOW
EOSINOPHIL # BLD AUTO: 0.1 10E3/UL (ref 0–0.7)
EOSINOPHIL NFR BLD AUTO: 1 %
ERYTHROCYTE [DISTWIDTH] IN BLOOD BY AUTOMATED COUNT: 12.9 % (ref 10–15)
GLUCOSE UR STRIP-MCNC: NEGATIVE MG/DL
HCT VFR BLD AUTO: 46.3 % (ref 40–53)
HGB BLD-MCNC: 15.9 G/DL (ref 13.3–17.7)
HGB UR QL STRIP: ABNORMAL
IMM GRANULOCYTES # BLD: 0 10E3/UL
IMM GRANULOCYTES NFR BLD: 0 %
KETONES UR STRIP-MCNC: NEGATIVE MG/DL
LEUKOCYTE ESTERASE UR QL STRIP: NEGATIVE
LYMPHOCYTES # BLD AUTO: 2.2 10E3/UL (ref 0.8–5.3)
LYMPHOCYTES NFR BLD AUTO: 39 %
MCH RBC QN AUTO: 29.3 PG (ref 26.5–33)
MCHC RBC AUTO-ENTMCNC: 34.3 G/DL (ref 31.5–36.5)
MCV RBC AUTO: 85 FL (ref 78–100)
MONOCYTES # BLD AUTO: 0.4 10E3/UL (ref 0–1.3)
MONOCYTES NFR BLD AUTO: 8 %
NEUTROPHILS # BLD AUTO: 2.9 10E3/UL (ref 1.6–8.3)
NEUTROPHILS NFR BLD AUTO: 52 %
NITRATE UR QL: NEGATIVE
PH UR STRIP: 6 [PH] (ref 5–7)
PLATELET # BLD AUTO: 255 10E3/UL (ref 150–450)
RBC # BLD AUTO: 5.43 10E6/UL (ref 4.4–5.9)
RBC #/AREA URNS AUTO: ABNORMAL /HPF
SP GR UR STRIP: <=1.005 (ref 1–1.03)
UROBILINOGEN UR STRIP-ACNC: 0.2 E.U./DL
WBC # BLD AUTO: 5.7 10E3/UL (ref 4–11)
WBC #/AREA URNS AUTO: ABNORMAL /HPF

## 2025-06-24 PROCEDURE — 3075F SYST BP GE 130 - 139MM HG: CPT | Performed by: PHYSICIAN ASSISTANT

## 2025-06-24 PROCEDURE — 1125F AMNT PAIN NOTED PAIN PRSNT: CPT | Performed by: PHYSICIAN ASSISTANT

## 2025-06-24 PROCEDURE — 99214 OFFICE O/P EST MOD 30 MIN: CPT | Performed by: PHYSICIAN ASSISTANT

## 2025-06-24 PROCEDURE — 81001 URINALYSIS AUTO W/SCOPE: CPT | Performed by: PHYSICIAN ASSISTANT

## 2025-06-24 PROCEDURE — 36415 COLL VENOUS BLD VENIPUNCTURE: CPT | Performed by: PHYSICIAN ASSISTANT

## 2025-06-24 PROCEDURE — 86780 TREPONEMA PALLIDUM: CPT | Performed by: PHYSICIAN ASSISTANT

## 2025-06-24 PROCEDURE — 3079F DIAST BP 80-89 MM HG: CPT | Performed by: PHYSICIAN ASSISTANT

## 2025-06-24 PROCEDURE — 85025 COMPLETE CBC W/AUTO DIFF WBC: CPT | Performed by: PHYSICIAN ASSISTANT

## 2025-06-24 ASSESSMENT — ENCOUNTER SYMPTOMS
FEVER: 0
SORE THROAT: 0
CHEST TIGHTNESS: 0
WEAKNESS: 0
COLOR CHANGE: 0
DYSURIA: 0
VOMITING: 0
COUGH: 0
FREQUENCY: 0
FLANK PAIN: 0
HEADACHES: 0
DIARRHEA: 0
WHEEZING: 0
CHILLS: 0
DIZZINESS: 0
FATIGUE: 0
ABDOMINAL DISTENTION: 0
DIAPHORESIS: 0
WOUND: 0
BLOOD IN STOOL: 0
SHORTNESS OF BREATH: 0
RECTAL PAIN: 0
NUMBNESS: 0
HEMATURIA: 0
ABDOMINAL PAIN: 1
CONSTIPATION: 0
NAUSEA: 0

## 2025-06-24 ASSESSMENT — PAIN SCALES - GENERAL: PAINLEVEL_OUTOF10: SEVERE PAIN (7)

## 2025-06-24 NOTE — PROGRESS NOTES
Urgent Care Clinic Visit    Chief Complaint   Patient presents with    STD     Patient requesting testing for syphilis                6/24/2025     5:05 PM   Additional Questions   Roomed by ANTONIO Ascencio   Accompanied by Self     Sonu Guaman LPN

## 2025-06-24 NOTE — TELEPHONE ENCOUNTER
6/24/25 at 3:46 pm Pt returning VM left by ED Results Team.  Pt wanting to know his STI test results (Chlamydia and Gonorrheae).  Pt was thinking he had a Syphilis test as well; shared with pt no test was ordered for Syphilis.  Apurva Wagner RN  Emergency Department Results Team

## 2025-06-24 NOTE — PROGRESS NOTES
Margaux Dickinson is a 39 year old, presenting for the following health issues:  STD (Patient requesting testing for syphilis )  FIDE Aquino is a 39 year old male with a history of nephrolithiasis here with concerns for syphilis testing and left lower periumbilical abdominal pain. He is currently being treated for chlamydia with oral doxycyline. No known exposure for syphilis, however he is here today because he would like to be tested. He denies any genital lesions or sores.     Abdominal/Flank Pain  Onset/Duration: 4days  Description:   Character: dull  Location: LLQ  Radiation: None  Intensity: 5/10  Progression of Symptoms:  same  Accompanying Signs & Symptoms:  Fever/Chills: no  Gas/Bloating: no  Nausea: no  Vomitting: no  Diarrhea: Yes, resolved now  Constipation: no  Dysuria or Hematuria: No dysuria, urinary frequency, urgency or hematuria.  No penile discharge or testicular pain  History:   Trauma: no  Previous similar pain: Yes, reports hx of kidney stones and symptoms feel similar  Previous tests done: none  Precipitating factors:   Does the pain change with:     Food: no    Bowel Movement: no    Urination: no   Other factors:  no  Therapies tried and outcome: rest, fluids, advil, tylenol with minimal relief     Patient Active Problem List   Diagnosis    High triglycerides    Seizure disorder (H)    Insomnia, unspecified type    Essential hypertension    Schizoaffective disorder, bipolar type (H)    Antisocial personality disorder (H)    PTSD (post-traumatic stress disorder)    ELIAZAR (generalized anxiety disorder)    Polysubstance abuse (H)    Finger injury, right, sequela    Class 1 obesity due to excess calories with serious comorbidity and body mass index (BMI) of 31.0 to 31.9 in adult    Alcohol withdrawal seizure with complication (H)    Hypercholesterolemia    Paranoid schizophrenia (H)    Allergic reaction    Closed right ankle fracture, sequela    Gait abnormality     Current  Outpatient Medications   Medication Sig Dispense Refill    acetaminophen (TYLENOL) 325 MG tablet Take 1-2 tablets (325-650 mg) by mouth every 6 hours as needed for mild pain.      carboxymethylcellulose (REFRESH PLUS) 0.5 % SOLN ophthalmic solution Place 1 drop Into the left eye 3 times daily as needed for dry eyes. 15 mL 0    diphenhydrAMINE (BENADRYL) 25 MG tablet Take 2 tablets (50 mg) by mouth every 6 hours as needed (allergic reactions) 60 tablet 1    doxycycline monohydrate (MONODOX) 100 MG capsule Take 1 capsule (100 mg) by mouth 2 times daily. 14 capsule 0    EPINEPHrine (ANY BX GENERIC EQUIV) 0.3 MG/0.3ML injection 2-pack INJECT 0.3ML INTRAMUSCULARLY AS NEEDED FOR ANAPHYLAXIS 2 each 1    gabapentin (NEURONTIN) 300 MG capsule Take 300 mg by mouth 3 times daily      ibuprofen (ADVIL/MOTRIN) 600 MG tablet Take 1 tablet (600 mg) by mouth every 8 hours as needed for moderate pain.      OLANZapine (ZYPREXA) 15 MG tablet Take 1 tablet (15 mg) by mouth at bedtime.      vitamin D3 (CHOLECALCIFEROL) 50 mcg (2000 units) tablet Take 1 tablet (50 mcg) by mouth daily.       No current facility-administered medications for this visit.        Allergies   Allergen Reactions    Banana Unknown    Egg [Egg Shells] Unknown     pt states only fried eggs    Lavender Oil      detergent    Oats [Oatmeal] Unknown    Palmers Coconut Oil Body [Dimethicone]     Tomato Unknown     Review of Systems   Constitutional:  Negative for chills, diaphoresis, fatigue and fever.   HENT:  Negative for sore throat.    Respiratory:  Negative for cough, chest tightness, shortness of breath and wheezing.    Cardiovascular:  Negative for chest pain.   Gastrointestinal:  Positive for abdominal pain. Negative for abdominal distention, blood in stool, constipation, diarrhea, nausea, rectal pain and vomiting.   Genitourinary:  Negative for dysuria, flank pain, frequency, genital sores, hematuria, penile discharge, penile pain, scrotal swelling, testicular  "pain and urgency.   Skin:  Positive for rash. Negative for color change, pallor and wound.   Neurological:  Negative for dizziness, weakness, numbness and headaches.         Objective    /85 (BP Location: Left arm, Patient Position: Sitting, Cuff Size: Adult Regular)   Pulse 86   Temp 98.5  F (36.9  C) (Oral)   Resp 16   Ht 1.702 m (5' 7\")   Wt 87.5 kg (193 lb)   SpO2 98%   BMI 30.23 kg/m    Body mass index is 30.23 kg/m .  Physical Exam  Vitals and nursing note reviewed.   Constitutional:       General: He is not in acute distress.     Appearance: Normal appearance. He is well-developed and normal weight. He is not ill-appearing.   HENT:      Head: Normocephalic.      Mouth/Throat:      Mouth: Mucous membranes are moist.   Cardiovascular:      Rate and Rhythm: Normal rate and regular rhythm.      Pulses: Normal pulses.      Heart sounds: Normal heart sounds, S1 normal and S2 normal. No murmur heard.     No friction rub. No gallop.   Pulmonary:      Effort: Pulmonary effort is normal. No accessory muscle usage or respiratory distress.      Breath sounds: Normal breath sounds and air entry. No wheezing or rales.   Abdominal:      General: Abdomen is flat. Bowel sounds are normal. There is no distension.      Palpations: Abdomen is soft. There is no hepatomegaly, splenomegaly, mass or pulsatile mass.      Tenderness: There is abdominal tenderness in the periumbilical area and left lower quadrant. There is no right CVA tenderness, left CVA tenderness, guarding or rebound. Negative signs include Patel's sign, Rovsing's sign, McBurney's sign, psoas sign and obturator sign.      Hernia: No hernia is present. There is no hernia in the umbilical area, left inguinal area or right inguinal area.       Skin:     General: Skin is warm and dry.      Capillary Refill: Capillary refill takes less than 2 seconds.      Findings: Lesion (Multiple diffuse healing ulcers on back and left side of buttock, No redness, " swelling, warmth or drainage noted.) present. No rash.   Neurological:      Mental Status: He is alert and oriented to person, place, and time.   Psychiatric:         Behavior: Behavior normal.         Thought Content: Thought content normal.         Judgment: Judgment normal.       Results for orders placed or performed in visit on 06/24/25 (from the past 24 hours)   CBC with platelets and differential    Narrative    The following orders were created for panel order CBC with platelets and differential.  Procedure                               Abnormality         Status                     ---------                               -----------         ------                     CBC with platelets and ...[[id number]]                      Final result                 Please view results for these tests on the individual orders.   CBC with platelets and differential   Result Value Ref Range    WBC Count 5.7 4.0 - 11.0 10e3/uL    RBC Count 5.43 4.40 - 5.90 10e6/uL    Hemoglobin 15.9 13.3 - 17.7 g/dL    Hematocrit 46.3 40.0 - 53.0 %    MCV 85 78 - 100 fL    MCH 29.3 26.5 - 33.0 pg    MCHC 34.3 31.5 - 36.5 g/dL    RDW 12.9 10.0 - 15.0 %    Platelet Count 255 150 - 450 10e3/uL    % Neutrophils 52 %    % Lymphocytes 39 %    % Monocytes 8 %    % Eosinophils 1 %    % Basophils 1 %    % Immature Granulocytes 0 %    Absolute Neutrophils 2.9 1.6 - 8.3 10e3/uL    Absolute Lymphocytes 2.2 0.8 - 5.3 10e3/uL    Absolute Monocytes 0.4 0.0 - 1.3 10e3/uL    Absolute Eosinophils 0.1 0.0 - 0.7 10e3/uL    Absolute Basophils 0.0 0.0 - 0.2 10e3/uL    Absolute Immature Granulocytes 0.0 <=0.4 10e3/uL   UA Macroscopic with reflex to Microscopic and Culture - Lab Collect    Specimen: Urine, Clean Catch   Result Value Ref Range    Color Urine Yellow Colorless, Straw, Light Yellow, Yellow    Appearance Urine Clear Clear    Glucose Urine Negative Negative mg/dL    Bilirubin Urine Negative Negative    Ketones Urine Negative Negative mg/dL    Specific  Gravity Urine <=1.005 1.003 - 1.035    Blood Urine Trace (A) Negative    pH Urine 6.0 5.0 - 7.0    Protein Albumin Urine Negative Negative mg/dL    Urobilinogen Urine 0.2 0.2, 1.0 E.U./dL    Nitrite Urine Negative Negative    Leukocyte Esterase Urine Negative Negative   UA Microscopic with Reflex to Culture   Result Value Ref Range    Bacteria Urine Few (A) None Seen /HPF    RBC Urine 0-2 0-2 /HPF /HPF    WBC Urine 0-5 0-5 /HPF /HPF    Narrative    Urine Culture not indicated       1. Abdominal pain, left lower quadrant:  Urinalysis shows trace hematuria, which may suggest the presence of a renal calculus. Although the patient exhibits no CVA tenderness or flank pain at this time, his history is notable for nephrolithiasis requiring ureteral stent placement in 2019. He is not currently in acute distress and expressed greater concern regarding syphilis testing.He is afebrile, CBC results were reassuring with no evidence suggestive of systemic infection. Recommend patient go to the ER for further imaging and evaluation of abdominal pain. The patient declined ambulance transport and will drive himself. He was discharged in stable condition with AVS provided. Advised to follow up with primary care provider after ED evaluation.  - UA Macroscopic with reflex to Microscopic and Culture - Lab Collect  - CBC with platelets and differential; Future  - CBC with platelets and differential  - UA Microscopic with Reflex to Culture    2. Screening for STD (sexually transmitted disease) (Primary)  Patient is asymptomatic at this time, however requesting for syphilis testing. Denies genital sores or lesions or exposure. Will call patient with any abnormal results.   - Treponema Abs w Reflex to RPR and Titer; Future  - Treponema Abs w Reflex to RPR and Titer      Rock Lopez RN, APRN Student June 24, 2025  6:24 PM     Physician Attestation   I, Shala Glass PA-C, was present with the medical/JOSE student who participated in the service  and in the documentation of the note.  I have verified the history and personally performed the physical exam and medical decision making.  I agree with the assessment and plan of care as documented in the note.        Shala Glass PA-C         Initial (On Arrival)

## 2025-06-25 LAB — T PALLIDUM AB SER QL: NONREACTIVE

## 2025-06-25 NOTE — TELEPHONE ENCOUNTER
Alok called requesting STD results. Reports he is feeling better.   Treponema Abs w Reflex to RPR and Titer Result are not back yet they are still in process.    Romelia Machado RN on 6/25/2025 at 9:11 AM

## 2025-07-23 ENCOUNTER — OFFICE VISIT (OUTPATIENT)
Dept: PODIATRY | Facility: CLINIC | Age: 40
End: 2025-07-23
Payer: MEDICAID

## 2025-07-23 VITALS — HEIGHT: 67 IN | BODY MASS INDEX: 30.45 KG/M2 | WEIGHT: 194 LBS

## 2025-07-23 DIAGNOSIS — L60.3 ONYCHODYSTROPHY: Primary | ICD-10-CM

## 2025-07-23 DIAGNOSIS — L60.4 BEAU'S LINE: ICD-10-CM

## 2025-07-23 DIAGNOSIS — S91.209A AVULSION OF TOENAIL, INITIAL ENCOUNTER: ICD-10-CM

## 2025-07-23 PROBLEM — S82.891B ANKLE FRACTURE, RIGHT, OPEN TYPE I OR II, INITIAL ENCOUNTER: Status: ACTIVE | Noted: 2024-03-02

## 2025-07-23 PROBLEM — F22 PARANOID DELUSION (H): Status: ACTIVE | Noted: 2024-08-03

## 2025-07-23 PROBLEM — R41.82 ALTERED MENTAL STATE: Status: ACTIVE | Noted: 2017-09-28

## 2025-07-23 PROBLEM — F22 PARANOID IDEATION (H): Status: ACTIVE | Noted: 2024-08-03

## 2025-07-23 PROBLEM — T74.22XA VICTIM OF CHILD MOLESTATION: Status: ACTIVE | Noted: 2025-07-23

## 2025-07-23 PROBLEM — T50.902A: Status: ACTIVE | Noted: 2017-09-28

## 2025-07-23 PROBLEM — M79.5: Status: ACTIVE | Noted: 2025-02-21

## 2025-07-23 PROBLEM — Z62.22: Status: ACTIVE | Noted: 2025-07-23

## 2025-07-23 PROBLEM — Z72.811 ADULT ANTISOCIAL BEHAVIOR: Status: ACTIVE | Noted: 2025-07-23

## 2025-07-23 PROBLEM — F41.9 ANXIETY: Chronic | Status: ACTIVE | Noted: 2023-07-21
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
## 2025-07-23 PROBLEM — N20.0 NEPHROLITHIASIS: Status: ACTIVE | Noted: 2018-05-10

## 2025-07-23 PROBLEM — R46.89 AGGRESSIVE BEHAVIOR OF CHILD: Status: ACTIVE | Noted: 2025-07-23

## 2025-07-23 PROBLEM — Z59.00 HOMELESS: Status: ACTIVE | Noted: 2025-07-23

## 2025-07-23 PROBLEM — T39.1X1A OVERDOSE OF ACETAMINOPHEN: Status: ACTIVE | Noted: 2025-07-23
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
## 2025-07-23 PROCEDURE — 99213 OFFICE O/P EST LOW 20 MIN: CPT | Performed by: PODIATRIST

## 2025-07-23 RX ORDER — OLANZAPINE 20 MG/1
20 TABLET, FILM COATED ORAL
COMMUNITY
Start: 2025-06-30

## 2025-07-23 NOTE — PATIENT INSTRUCTIONS
We wish you continued good healing. If you have any questions or concerns, please do not hesitate to contact us at  [phone]    [facility] (secure e-mail communication and access to your chart) to send a message or to make an appointment.    Please remember to call and schedule a follow up appointment if one was recommended at your earliest convenience.     PODIATRY CLINIC HOURS  TELEPHONE NUMBER    Dr. Harvey GRANDAPKARTIK FACFAS        Clinics:  [facility]   [place]  Tuesday 1PM-6PM  [place]  Wednesday 745AM-330PM  [place]  Monday 2nd,4th  830AM-4PM  Thursday/Friday 745AM-230PM     [place] APPOINTMENTS  [phone]    [place] APPOINTMENTS  [phone]          If you need a medication refill, please contact us you may need lab work and/or a follow up visit prior to your refill (i.e. Antifungal medications).  If MRI needed please call Imaging at [phone]  HOW DO I GET MY KNEE SCOOTER? Knee scooters can be picked up at ANY Medical Supply stores with your knee scooter Prescription.  OR  Bring your signed prescription to an [facility] showroom.   Set up an appointment for your custom Orthotics. Call any Orthotics locations call [phone]
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
## 2025-07-24 RX ORDER — CICLOPIROX 80 MG/ML
SOLUTION TOPICAL DAILY
Qty: 6 ML | Refills: 4 | Status: SHIPPED | OUTPATIENT
Start: 2025-07-24 | End: 2026-07-24
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
